# Patient Record
Sex: MALE | Race: WHITE | Employment: OTHER | URBAN - METROPOLITAN AREA
[De-identification: names, ages, dates, MRNs, and addresses within clinical notes are randomized per-mention and may not be internally consistent; named-entity substitution may affect disease eponyms.]

---

## 2019-08-16 ENCOUNTER — APPOINTMENT (OUTPATIENT)
Dept: GENERAL RADIOLOGY | Age: 84
End: 2019-08-16
Payer: MEDICARE

## 2019-08-16 ENCOUNTER — APPOINTMENT (OUTPATIENT)
Dept: CT IMAGING | Age: 84
End: 2019-08-16
Payer: MEDICARE

## 2019-08-16 ENCOUNTER — HOSPITAL ENCOUNTER (EMERGENCY)
Age: 84
Discharge: HOME | End: 2019-08-16
Attending: EMERGENCY MEDICINE
Payer: MEDICARE

## 2019-08-16 VITALS
HEIGHT: 69 IN | WEIGHT: 172 LBS | TEMPERATURE: 98 F | BODY MASS INDEX: 25.48 KG/M2 | HEART RATE: 56 BPM | OXYGEN SATURATION: 97 % | RESPIRATION RATE: 16 BRPM | DIASTOLIC BLOOD PRESSURE: 92 MMHG | SYSTOLIC BLOOD PRESSURE: 165 MMHG

## 2019-08-16 DIAGNOSIS — R42 DIZZINESS: Primary | ICD-10-CM

## 2019-08-16 DIAGNOSIS — I95.1 ORTHOSTATIC HYPOTENSION: ICD-10-CM

## 2019-08-16 LAB
ALBUMIN SERPL-MCNC: 4.3 G/DL (ref 3.5–5.2)
ALP BLD-CCNC: 77 U/L (ref 40–129)
ALT SERPL-CCNC: 9 U/L (ref 0–40)
ANION GAP SERPL CALCULATED.3IONS-SCNC: 7 MMOL/L (ref 7–16)
APTT: 29.6 SEC (ref 24.5–35.1)
AST SERPL-CCNC: 16 U/L (ref 0–39)
BILIRUB SERPL-MCNC: 0.8 MG/DL (ref 0–1.2)
BILIRUBIN URINE: NEGATIVE
BLOOD, URINE: NEGATIVE
BUN BLDV-MCNC: 21 MG/DL (ref 8–23)
CALCIUM SERPL-MCNC: 8.9 MG/DL (ref 8.6–10.2)
CHLORIDE BLD-SCNC: 101 MMOL/L (ref 98–107)
CLARITY: CLEAR
CO2: 33 MMOL/L (ref 22–29)
COLOR: YELLOW
CREAT SERPL-MCNC: 1.2 MG/DL (ref 0.7–1.2)
EKG ATRIAL RATE: 54 BPM
EKG P AXIS: 82 DEGREES
EKG P-R INTERVAL: 244 MS
EKG Q-T INTERVAL: 428 MS
EKG QRS DURATION: 86 MS
EKG QTC CALCULATION (BAZETT): 405 MS
EKG R AXIS: 12 DEGREES
EKG T AXIS: 61 DEGREES
EKG VENTRICULAR RATE: 54 BPM
GFR AFRICAN AMERICAN: >60
GFR NON-AFRICAN AMERICAN: 58 ML/MIN/1.73
GLUCOSE BLD-MCNC: 108 MG/DL (ref 74–99)
GLUCOSE URINE: NEGATIVE MG/DL
HCT VFR BLD CALC: 43.5 % (ref 37–54)
HEMOGLOBIN: 13.7 G/DL (ref 12.5–16.5)
INR BLD: 1
KETONES, URINE: NEGATIVE MG/DL
LEUKOCYTE ESTERASE, URINE: NEGATIVE
MCH RBC QN AUTO: 27.5 PG (ref 26–35)
MCHC RBC AUTO-ENTMCNC: 31.5 % (ref 32–34.5)
MCV RBC AUTO: 87.2 FL (ref 80–99.9)
NITRITE, URINE: NEGATIVE
PDW BLD-RTO: 15.5 FL (ref 11.5–15)
PH UA: 5.5 (ref 5–9)
PLATELET # BLD: 206 E9/L (ref 130–450)
PMV BLD AUTO: 9.8 FL (ref 7–12)
POTASSIUM SERPL-SCNC: 4.7 MMOL/L (ref 3.5–5)
PRO-BNP: 360 PG/ML (ref 0–450)
PROTEIN UA: NEGATIVE MG/DL
PROTHROMBIN TIME: 11.6 SEC (ref 9.3–12.4)
RBC # BLD: 4.99 E12/L (ref 3.8–5.8)
SODIUM BLD-SCNC: 141 MMOL/L (ref 132–146)
SPECIFIC GRAVITY UA: 1.01 (ref 1–1.03)
TOTAL PROTEIN: 7.3 G/DL (ref 6.4–8.3)
TROPONIN: <0.01 NG/ML (ref 0–0.03)
UROBILINOGEN, URINE: 1 E.U./DL
WBC # BLD: 7.1 E9/L (ref 4.5–11.5)

## 2019-08-16 PROCEDURE — 85027 COMPLETE CBC AUTOMATED: CPT

## 2019-08-16 PROCEDURE — 99284 EMERGENCY DEPT VISIT MOD MDM: CPT

## 2019-08-16 PROCEDURE — 93005 ELECTROCARDIOGRAM TRACING: CPT | Performed by: NURSE PRACTITIONER

## 2019-08-16 PROCEDURE — 2580000003 HC RX 258: Performed by: PREVENTIVE MEDICINE

## 2019-08-16 PROCEDURE — 84484 ASSAY OF TROPONIN QUANT: CPT

## 2019-08-16 PROCEDURE — 85610 PROTHROMBIN TIME: CPT

## 2019-08-16 PROCEDURE — 81003 URINALYSIS AUTO W/O SCOPE: CPT

## 2019-08-16 PROCEDURE — 93010 ELECTROCARDIOGRAM REPORT: CPT | Performed by: INTERNAL MEDICINE

## 2019-08-16 PROCEDURE — 36415 COLL VENOUS BLD VENIPUNCTURE: CPT

## 2019-08-16 PROCEDURE — 80053 COMPREHEN METABOLIC PANEL: CPT

## 2019-08-16 PROCEDURE — 83880 ASSAY OF NATRIURETIC PEPTIDE: CPT

## 2019-08-16 PROCEDURE — 85730 THROMBOPLASTIN TIME PARTIAL: CPT

## 2019-08-16 PROCEDURE — 71046 X-RAY EXAM CHEST 2 VIEWS: CPT

## 2019-08-16 PROCEDURE — 70450 CT HEAD/BRAIN W/O DYE: CPT

## 2019-08-16 RX ORDER — 0.9 % SODIUM CHLORIDE 0.9 %
1000 INTRAVENOUS SOLUTION INTRAVENOUS ONCE
Status: COMPLETED | OUTPATIENT
Start: 2019-08-16 | End: 2019-08-16

## 2019-08-16 RX ORDER — 0.9 % SODIUM CHLORIDE 0.9 %
2000 INTRAVENOUS SOLUTION INTRAVENOUS ONCE
Status: DISCONTINUED | OUTPATIENT
Start: 2019-08-16 | End: 2019-08-16 | Stop reason: HOSPADM

## 2019-08-16 RX ADMIN — SODIUM CHLORIDE 1000 ML: 9 INJECTION, SOLUTION INTRAVENOUS at 14:00

## 2019-08-16 ASSESSMENT — ENCOUNTER SYMPTOMS
CHEST TIGHTNESS: 0
SHORTNESS OF BREATH: 0
ABDOMINAL PAIN: 0
CONSTIPATION: 0
NAUSEA: 0
VOMITING: 0
COUGH: 0
DIARRHEA: 0
ALLERGIC/IMMUNOLOGIC NEGATIVE: 1

## 2019-08-27 ENCOUNTER — HOSPITAL ENCOUNTER (OUTPATIENT)
Age: 84
Setting detail: OBSERVATION
Discharge: HOME HEALTH | End: 2019-08-29
Attending: EMERGENCY MEDICINE | Admitting: INTERNAL MEDICINE
Payer: MEDICARE

## 2019-08-27 ENCOUNTER — APPOINTMENT (OUTPATIENT)
Dept: ULTRASOUND IMAGING | Age: 84
End: 2019-08-27
Payer: MEDICARE

## 2019-08-27 ENCOUNTER — APPOINTMENT (OUTPATIENT)
Dept: GENERAL RADIOLOGY | Age: 84
End: 2019-08-27
Payer: MEDICARE

## 2019-08-27 DIAGNOSIS — R42 DIZZINESS: ICD-10-CM

## 2019-08-27 DIAGNOSIS — R55 NEAR SYNCOPE: Primary | ICD-10-CM

## 2019-08-27 LAB
ALBUMIN SERPL-MCNC: 4.3 G/DL (ref 3.5–5.2)
ALP BLD-CCNC: 82 U/L (ref 40–129)
ALT SERPL-CCNC: 9 U/L (ref 0–40)
ANION GAP SERPL CALCULATED.3IONS-SCNC: 7 MMOL/L (ref 7–16)
AST SERPL-CCNC: 15 U/L (ref 0–39)
BASOPHILS ABSOLUTE: 0.05 E9/L (ref 0–0.2)
BASOPHILS RELATIVE PERCENT: 0.5 % (ref 0–2)
BILIRUB SERPL-MCNC: 0.7 MG/DL (ref 0–1.2)
BILIRUBIN URINE: NEGATIVE
BLOOD, URINE: NEGATIVE
BUN BLDV-MCNC: 23 MG/DL (ref 8–23)
CALCIUM SERPL-MCNC: 9.4 MG/DL (ref 8.6–10.2)
CHLORIDE BLD-SCNC: 101 MMOL/L (ref 98–107)
CLARITY: CLEAR
CO2: 32 MMOL/L (ref 22–29)
COLOR: YELLOW
CREAT SERPL-MCNC: 1.2 MG/DL (ref 0.7–1.2)
EOSINOPHILS ABSOLUTE: 0.11 E9/L (ref 0.05–0.5)
EOSINOPHILS RELATIVE PERCENT: 1.1 % (ref 0–6)
GFR AFRICAN AMERICAN: >60
GFR NON-AFRICAN AMERICAN: 58 ML/MIN/1.73
GLUCOSE BLD-MCNC: 97 MG/DL (ref 74–99)
GLUCOSE URINE: NEGATIVE MG/DL
HCT VFR BLD CALC: 45.1 % (ref 37–54)
HEMOGLOBIN: 14.5 G/DL (ref 12.5–16.5)
IMMATURE GRANULOCYTES #: 0.04 E9/L
IMMATURE GRANULOCYTES %: 0.4 % (ref 0–5)
KETONES, URINE: NEGATIVE MG/DL
LEUKOCYTE ESTERASE, URINE: NEGATIVE
LYMPHOCYTES ABSOLUTE: 1.57 E9/L (ref 1.5–4)
LYMPHOCYTES RELATIVE PERCENT: 15.2 % (ref 20–42)
MAGNESIUM: 2.2 MG/DL (ref 1.6–2.6)
MCH RBC QN AUTO: 28.2 PG (ref 26–35)
MCHC RBC AUTO-ENTMCNC: 32.2 % (ref 32–34.5)
MCV RBC AUTO: 87.7 FL (ref 80–99.9)
MONOCYTES ABSOLUTE: 0.53 E9/L (ref 0.1–0.95)
MONOCYTES RELATIVE PERCENT: 5.1 % (ref 2–12)
NEUTROPHILS ABSOLUTE: 8.04 E9/L (ref 1.8–7.3)
NEUTROPHILS RELATIVE PERCENT: 77.7 % (ref 43–80)
NITRITE, URINE: NEGATIVE
PDW BLD-RTO: 15.3 FL (ref 11.5–15)
PH UA: 6.5 (ref 5–9)
PLATELET # BLD: 239 E9/L (ref 130–450)
PMV BLD AUTO: 10 FL (ref 7–12)
POTASSIUM SERPL-SCNC: 4.9 MMOL/L (ref 3.5–5)
PROTEIN UA: NEGATIVE MG/DL
RBC # BLD: 5.14 E12/L (ref 3.8–5.8)
SODIUM BLD-SCNC: 140 MMOL/L (ref 132–146)
SPECIFIC GRAVITY UA: <=1.005 (ref 1–1.03)
TOTAL PROTEIN: 7.3 G/DL (ref 6.4–8.3)
TROPONIN: <0.01 NG/ML (ref 0–0.03)
UROBILINOGEN, URINE: 0.2 E.U./DL
WBC # BLD: 10.3 E9/L (ref 4.5–11.5)

## 2019-08-27 PROCEDURE — 2580000003 HC RX 258: Performed by: INTERNAL MEDICINE

## 2019-08-27 PROCEDURE — 99285 EMERGENCY DEPT VISIT HI MDM: CPT

## 2019-08-27 PROCEDURE — 81003 URINALYSIS AUTO W/O SCOPE: CPT

## 2019-08-27 PROCEDURE — 36415 COLL VENOUS BLD VENIPUNCTURE: CPT

## 2019-08-27 PROCEDURE — 85025 COMPLETE CBC W/AUTO DIFF WBC: CPT

## 2019-08-27 PROCEDURE — 83735 ASSAY OF MAGNESIUM: CPT

## 2019-08-27 PROCEDURE — 93005 ELECTROCARDIOGRAM TRACING: CPT | Performed by: NURSE PRACTITIONER

## 2019-08-27 PROCEDURE — 6370000000 HC RX 637 (ALT 250 FOR IP): Performed by: NURSE PRACTITIONER

## 2019-08-27 PROCEDURE — 84484 ASSAY OF TROPONIN QUANT: CPT

## 2019-08-27 PROCEDURE — G0378 HOSPITAL OBSERVATION PER HR: HCPCS

## 2019-08-27 PROCEDURE — 71046 X-RAY EXAM CHEST 2 VIEWS: CPT

## 2019-08-27 PROCEDURE — 93880 EXTRACRANIAL BILAT STUDY: CPT

## 2019-08-27 PROCEDURE — 80053 COMPREHEN METABOLIC PANEL: CPT

## 2019-08-27 RX ORDER — SODIUM CHLORIDE 0.9 % (FLUSH) 0.9 %
10 SYRINGE (ML) INJECTION PRN
Status: DISCONTINUED | OUTPATIENT
Start: 2019-08-27 | End: 2019-08-29 | Stop reason: HOSPADM

## 2019-08-27 RX ORDER — SODIUM CHLORIDE 0.9 % (FLUSH) 0.9 %
10 SYRINGE (ML) INJECTION EVERY 12 HOURS SCHEDULED
Status: DISCONTINUED | OUTPATIENT
Start: 2019-08-27 | End: 2019-08-29 | Stop reason: HOSPADM

## 2019-08-27 RX ORDER — ACETAMINOPHEN 325 MG/1
650 TABLET ORAL EVERY 4 HOURS PRN
Status: DISCONTINUED | OUTPATIENT
Start: 2019-08-27 | End: 2019-08-29 | Stop reason: HOSPADM

## 2019-08-27 RX ORDER — LEVOTHYROXINE SODIUM 0.07 MG/1
75 TABLET ORAL DAILY
COMMUNITY
Start: 2019-08-19

## 2019-08-27 RX ORDER — SODIUM CHLORIDE 9 MG/ML
INJECTION, SOLUTION INTRAVENOUS CONTINUOUS
Status: DISCONTINUED | OUTPATIENT
Start: 2019-08-27 | End: 2019-08-28

## 2019-08-27 RX ORDER — ACETAMINOPHEN 325 MG/1
650 TABLET ORAL EVERY 4 HOURS PRN
Status: DISCONTINUED | OUTPATIENT
Start: 2019-08-27 | End: 2019-08-27

## 2019-08-27 RX ORDER — CHOLECALCIFEROL (VITAMIN D3) 1250 MCG
50000 CAPSULE ORAL
COMMUNITY

## 2019-08-27 RX ORDER — SODIUM CHLORIDE 0.9 % (FLUSH) 0.9 %
10 SYRINGE (ML) INJECTION PRN
Status: DISCONTINUED | OUTPATIENT
Start: 2019-08-27 | End: 2019-08-27

## 2019-08-27 RX ORDER — ONDANSETRON 2 MG/ML
4 INJECTION INTRAMUSCULAR; INTRAVENOUS EVERY 6 HOURS PRN
Status: DISCONTINUED | OUTPATIENT
Start: 2019-08-27 | End: 2019-08-29 | Stop reason: HOSPADM

## 2019-08-27 RX ORDER — MECLIZINE HCL 12.5 MG/1
12.5 TABLET ORAL ONCE
Status: COMPLETED | OUTPATIENT
Start: 2019-08-27 | End: 2019-08-27

## 2019-08-27 RX ORDER — SODIUM CHLORIDE 0.9 % (FLUSH) 0.9 %
10 SYRINGE (ML) INJECTION EVERY 12 HOURS SCHEDULED
Status: DISCONTINUED | OUTPATIENT
Start: 2019-08-27 | End: 2019-08-27

## 2019-08-27 RX ADMIN — SODIUM CHLORIDE: 9 INJECTION, SOLUTION INTRAVENOUS at 22:39

## 2019-08-27 RX ADMIN — MECLIZINE 12.5 MG: 12.5 TABLET ORAL at 17:20

## 2019-08-27 RX ADMIN — Medication 10 ML: at 22:40

## 2019-08-27 ASSESSMENT — PAIN SCALES - GENERAL: PAINLEVEL_OUTOF10: 0

## 2019-08-27 NOTE — ED PROVIDER NOTES
ms    QTc Calculation (Bazett) 410 ms    P Axis 74 degrees    R Axis 27 degrees    T Axis 55 degrees     Imaging: All Radiology results interpreted by Radiologist unless otherwise noted. XR CHEST STANDARD (2 VW)   Final Result      Persistent opacities at left lung base are related to atelectasis or   persistent pneumonia. Continued follow-up may be helpful for further   evaluation. US CAROTID ARTERY BILATERAL    (Results Pending)     EKG #1:  Interpreted by emergency department physician unless otherwise noted. Time:  1223  Rate: 61  Rhythm: Sinus and 1st degree av block. Interpretation:normal sinus rhythm, 1st degree AV block. unchanged from previous ekg    ED Course / Medical Decision Making     Medications   meclizine (ANTIVERT) tablet 12.5 mg (12.5 mg Oral Given 8/27/19 1720)        Re-Evaluations:  8/27/19      Time: 1820  At this time states that he is feeling \"okay\". Patient agreeable to hospital admission. Son at the bedside. Consultations:             IP CONSULT TO HOSPITALIST  Spoke with Dr. Ana Chaudhary (Medicine). Discussed case. They will admit this patient. Procedures:   none    MDM: patient will be admitted to telemetry for near syncope. At this time the patient is nontoxic in appearance is in no distress. Patient's questions were answered. Son is at the bedside. Patient and his son are agreeable to hospital admission at this time. Patient was given Antivert fluids. Patient with positive orthostatic vital signs. Counseling:   I have spoken with the son and patient and discussed todays results, in addition to providing specific details for the plan of care and counseling regarding the diagnosis and prognosis and are agreeable with the plan. Assessment      1. Near syncope    2. Dizziness      This patient's ED course included: a personal history and physicial examination  This patient has remained hemodynamically stable during their ED course.    Plan   Admit to

## 2019-08-28 ENCOUNTER — APPOINTMENT (OUTPATIENT)
Dept: GENERAL RADIOLOGY | Age: 84
End: 2019-08-28
Payer: MEDICARE

## 2019-08-28 ENCOUNTER — APPOINTMENT (OUTPATIENT)
Dept: MRI IMAGING | Age: 84
End: 2019-08-28
Payer: MEDICARE

## 2019-08-28 PROBLEM — E03.9 ACQUIRED HYPOTHYROIDISM: Chronic | Status: ACTIVE | Noted: 2019-08-28

## 2019-08-28 PROBLEM — I10 ESSENTIAL HYPERTENSION: Chronic | Status: ACTIVE | Noted: 2019-08-28

## 2019-08-28 LAB
ANION GAP SERPL CALCULATED.3IONS-SCNC: 10 MMOL/L (ref 7–16)
BUN BLDV-MCNC: 18 MG/DL (ref 8–23)
CALCIUM SERPL-MCNC: 8.3 MG/DL (ref 8.6–10.2)
CHLORIDE BLD-SCNC: 106 MMOL/L (ref 98–107)
CO2: 27 MMOL/L (ref 22–29)
CREAT SERPL-MCNC: 1.1 MG/DL (ref 0.7–1.2)
EKG ATRIAL RATE: 61 BPM
EKG P AXIS: 74 DEGREES
EKG P-R INTERVAL: 232 MS
EKG Q-T INTERVAL: 408 MS
EKG QRS DURATION: 82 MS
EKG QTC CALCULATION (BAZETT): 410 MS
EKG R AXIS: 27 DEGREES
EKG T AXIS: 55 DEGREES
EKG VENTRICULAR RATE: 61 BPM
GFR AFRICAN AMERICAN: >60
GFR NON-AFRICAN AMERICAN: >60 ML/MIN/1.73
GLUCOSE BLD-MCNC: 88 MG/DL (ref 74–99)
HCT VFR BLD CALC: 37.3 % (ref 37–54)
HEMOGLOBIN: 12.1 G/DL (ref 12.5–16.5)
LV EF: 60 %
LVEF MODALITY: NORMAL
MCH RBC QN AUTO: 28.2 PG (ref 26–35)
MCHC RBC AUTO-ENTMCNC: 32.4 % (ref 32–34.5)
MCV RBC AUTO: 86.9 FL (ref 80–99.9)
PDW BLD-RTO: 15.4 FL (ref 11.5–15)
PLATELET # BLD: 167 E9/L (ref 130–450)
PMV BLD AUTO: 9.6 FL (ref 7–12)
POTASSIUM REFLEX MAGNESIUM: 4.8 MMOL/L (ref 3.5–5)
RBC # BLD: 4.29 E12/L (ref 3.8–5.8)
SODIUM BLD-SCNC: 143 MMOL/L (ref 132–146)
WBC # BLD: 5.9 E9/L (ref 4.5–11.5)

## 2019-08-28 PROCEDURE — 97530 THERAPEUTIC ACTIVITIES: CPT

## 2019-08-28 PROCEDURE — 80048 BASIC METABOLIC PNL TOTAL CA: CPT

## 2019-08-28 PROCEDURE — 2580000003 HC RX 258: Performed by: INTERNAL MEDICINE

## 2019-08-28 PROCEDURE — 97161 PT EVAL LOW COMPLEX 20 MIN: CPT

## 2019-08-28 PROCEDURE — 93306 TTE W/DOPPLER COMPLETE: CPT

## 2019-08-28 PROCEDURE — 96372 THER/PROPH/DIAG INJ SC/IM: CPT

## 2019-08-28 PROCEDURE — 85027 COMPLETE CBC AUTOMATED: CPT

## 2019-08-28 PROCEDURE — G0378 HOSPITAL OBSERVATION PER HR: HCPCS

## 2019-08-28 PROCEDURE — 6360000002 HC RX W HCPCS: Performed by: INTERNAL MEDICINE

## 2019-08-28 PROCEDURE — 36415 COLL VENOUS BLD VENIPUNCTURE: CPT

## 2019-08-28 PROCEDURE — 93010 ELECTROCARDIOGRAM REPORT: CPT | Performed by: INTERNAL MEDICINE

## 2019-08-28 PROCEDURE — 70030 X-RAY EYE FOR FOREIGN BODY: CPT

## 2019-08-28 RX ORDER — SODIUM CHLORIDE 9 MG/ML
INJECTION, SOLUTION INTRAVENOUS CONTINUOUS
Status: DISCONTINUED | OUTPATIENT
Start: 2019-08-28 | End: 2019-08-29 | Stop reason: HOSPADM

## 2019-08-28 RX ADMIN — SODIUM CHLORIDE: 9 INJECTION, SOLUTION INTRAVENOUS at 18:48

## 2019-08-28 RX ADMIN — ENOXAPARIN SODIUM 40 MG: 40 INJECTION SUBCUTANEOUS at 09:57

## 2019-08-28 RX ADMIN — Medication 10 ML: at 09:57

## 2019-08-28 ASSESSMENT — PAIN SCALES - GENERAL
PAINLEVEL_OUTOF10: 0

## 2019-08-28 NOTE — PLAN OF CARE
Problem: Falls - Risk of:  Goal: Will remain free from falls  Description  Will remain free from falls  Outcome: Met This Shift     Problem: Cardiac:  Goal: Ability to maintain an adequate cardiac output will improve  Description  Ability to maintain an adequate cardiac output will improve  Outcome: Met This Shift  Goal: Hemodynamic stability will improve  Description  Hemodynamic stability will improve  Outcome: Met This Shift

## 2019-08-28 NOTE — CARE COORDINATION
MURIEL Discharge planning:    SW met with patient. Patient lives alone in a 1 story home with 2 steps to enter. Patient owns a ww, cane and shower chair. Patient was independent PTA and is driving. Patient has no history of leyda or hhc. Plan is to return home with no anticipated discharge needs. Patients son to provide the transportation home. Pharmacy is [facility] in [place]. Will continue to follow.  [place], [place]

## 2019-08-28 NOTE — PROCEDURES
8/28/19 at 2:48 pm - PERCY Wills is aware that pt is claustrophobic. Pt refused MRI. [facility] if pt decides he wants to attempt the MRI again.

## 2019-08-29 VITALS
RESPIRATION RATE: 16 BRPM | TEMPERATURE: 97.6 F | SYSTOLIC BLOOD PRESSURE: 120 MMHG | OXYGEN SATURATION: 95 % | HEIGHT: 69 IN | HEART RATE: 72 BPM | BODY MASS INDEX: 26.36 KG/M2 | WEIGHT: 178 LBS | DIASTOLIC BLOOD PRESSURE: 69 MMHG

## 2019-08-29 PROBLEM — I95.1 ORTHOSTATIC HYPOTENSION: Status: ACTIVE | Noted: 2019-08-29

## 2019-08-29 PROBLEM — R42 DIZZINESS: Status: RESOLVED | Noted: 2019-08-27 | Resolved: 2019-08-29

## 2019-08-29 PROCEDURE — 2580000003 HC RX 258: Performed by: INTERNAL MEDICINE

## 2019-08-29 PROCEDURE — G0378 HOSPITAL OBSERVATION PER HR: HCPCS

## 2019-08-29 RX ADMIN — SODIUM CHLORIDE: 9 INJECTION, SOLUTION INTRAVENOUS at 05:03

## 2019-08-29 ASSESSMENT — PAIN SCALES - GENERAL: PAINLEVEL_OUTOF10: 0

## 2019-08-29 NOTE — PROGRESS NOTES
Occupational Therapy  OT consult received to eval/treat and chart review complete. Initial eval attempted, however, pt off unit. OT to re-attempt at a later time. Thank You.        [place], [place]  [id number]
PATIENT ORTHOSTATIC BP  BP:144/70 HR:65  BP:127/67 HR:77  BP:119/54 HR:71
08/27/19 178 lb (80.7 kg)   08/16/19 172 lb (78 kg)         Intake/Output:    Intake/Output Summary (Last 24 hours) at 8/29/2019 0953  Last data filed at 8/29/2019 0924  Gross per 24 hour   Intake 2813 ml   Output 1100 ml   Net 1713 ml     I/O this shift: In: 480 [P.O.:480]  Out: -       General: Awake, alert, oriented x3, no acute distress    Neck: No JVD or carotid bruits,     Cardiac: Regular rate and rhythm, normal S1 and  S2, no murmurs, no S4 or S3, no  rubs. Normal carotid upstroke and no bruits. Resp: Unlabored respirations at rest.  No wheezes, rales or rhonchi. Abdomen: soft, nontender, nondistended, BS+; no masses, bruits, or hepatomegaly    Extremities: no cyanosis, clubbing, or edema. Normal pulses in the upper/lower extremities bilaterally. Skin: Warm and dry, no bruises, petechiae or rashes. Neuro: moves all extremities appropriately to command, and normal sensation to light touch in the upper and lower extremities bilaterally. Laboratory Tests:  Lab Results   Component Value Date    CREATININE 1.1 08/28/2019    BUN 18 08/28/2019     08/28/2019    K 4.8 08/28/2019     08/28/2019    CO2 27 08/28/2019     Recent Labs     08/27/19  1258   TROPONINI <0.01     Lab Results   Component Value Date    PROBNP 360 08/16/2019     Lab Results   Component Value Date    WBC 5.9 08/28/2019    RBC 4.29 08/28/2019    HGB 12.1 08/28/2019    HCT 37.3 08/28/2019    MCV 86.9 08/28/2019    MCH 28.2 08/28/2019    MCHC 32.4 08/28/2019    RDW 15.4 08/28/2019     08/28/2019    MPV 9.6 08/28/2019     Recent Labs     08/27/19  1258   ALKPHOS 82   ALT 9   AST 15   PROT 7.3   BILITOT 0.7   LABALBU 4.3     Lab Results   Component Value Date    MG 2.2 08/27/2019     Lab Results   Component Value Date    PROTIME 11.6 08/16/2019    INR 1.0 08/16/2019     Radiology:  XR Eye Foreign Body   Final Result   No radiopaque foreign body is identified within the orbits.       US CAROTID ARTERY BILATERAL

## 2019-11-01 ENCOUNTER — HOSPITAL ENCOUNTER (OUTPATIENT)
Dept: CT IMAGING | Age: 84
Discharge: HOME | End: 2019-11-03
Payer: MEDICARE

## 2019-11-01 DIAGNOSIS — H90.3 SENSORY HEARING LOSS, BILATERAL: ICD-10-CM

## 2019-11-01 DIAGNOSIS — R42 DIZZINESS: ICD-10-CM

## 2019-11-01 PROCEDURE — 6360000004 HC RX CONTRAST MEDICATION: Performed by: RADIOLOGY

## 2019-11-01 PROCEDURE — 70496 CT ANGIOGRAPHY HEAD: CPT

## 2019-11-01 RX ADMIN — IOPAMIDOL 90 ML: 755 INJECTION, SOLUTION INTRAVENOUS at 19:06

## 2019-11-25 ENCOUNTER — INITIAL CONSULT (OUTPATIENT)
Dept: NEUROSURGERY | Age: 84
End: 2019-11-25
Payer: MEDICARE

## 2019-11-25 VITALS
BODY MASS INDEX: 27.4 KG/M2 | DIASTOLIC BLOOD PRESSURE: 82 MMHG | WEIGHT: 185 LBS | HEART RATE: 65 BPM | SYSTOLIC BLOOD PRESSURE: 144 MMHG | HEIGHT: 69 IN

## 2019-11-25 DIAGNOSIS — G96.08 SUBDURAL HYGROMA: Primary | ICD-10-CM

## 2019-11-25 PROCEDURE — G8427 DOCREV CUR MEDS BY ELIG CLIN: HCPCS | Performed by: PHYSICIAN ASSISTANT

## 2019-11-25 PROCEDURE — G8484 FLU IMMUNIZE NO ADMIN: HCPCS | Performed by: PHYSICIAN ASSISTANT

## 2019-11-25 PROCEDURE — 99203 OFFICE O/P NEW LOW 30 MIN: CPT | Performed by: PHYSICIAN ASSISTANT

## 2019-11-25 PROCEDURE — G8417 CALC BMI ABV UP PARAM F/U: HCPCS | Performed by: PHYSICIAN ASSISTANT

## 2019-11-25 PROCEDURE — 1123F ACP DISCUSS/DSCN MKR DOCD: CPT | Performed by: PHYSICIAN ASSISTANT

## 2019-11-25 PROCEDURE — 1036F TOBACCO NON-USER: CPT | Performed by: PHYSICIAN ASSISTANT

## 2019-11-25 PROCEDURE — 4040F PNEUMOC VAC/ADMIN/RCVD: CPT | Performed by: PHYSICIAN ASSISTANT

## 2019-11-25 RX ORDER — PRAVASTATIN SODIUM 40 MG
40 TABLET ORAL DAILY
COMMUNITY
End: 2022-01-01

## 2019-11-25 RX ORDER — SERTRALINE HYDROCHLORIDE 25 MG/1
25 TABLET, FILM COATED ORAL DAILY
COMMUNITY
End: 2022-01-01

## 2019-11-25 RX ORDER — MIDODRINE HYDROCHLORIDE 2.5 MG/1
2.5 TABLET ORAL 3 TIMES DAILY
COMMUNITY

## 2019-11-25 RX ORDER — M-VIT,TX,IRON,MINS/CALC/FOLIC 27MG-0.4MG
1 TABLET ORAL DAILY
COMMUNITY

## 2019-11-25 SDOH — HEALTH STABILITY: MENTAL HEALTH: HOW OFTEN DO YOU HAVE A DRINK CONTAINING ALCOHOL?: NEVER

## 2022-01-01 ENCOUNTER — HOSPITAL ENCOUNTER (OUTPATIENT)
Dept: INFUSION THERAPY | Age: 87
Discharge: HOME | DRG: 280 | End: 2022-08-22
Payer: MEDICARE

## 2022-01-01 ENCOUNTER — TELEPHONE (OUTPATIENT)
Dept: CASE MANAGEMENT | Age: 87
End: 2022-01-01

## 2022-01-01 ENCOUNTER — HOSPITAL ENCOUNTER (INPATIENT)
Age: 87
LOS: 2 days | DRG: 283 | End: 2022-09-14
Attending: EMERGENCY MEDICINE | Admitting: INTERNAL MEDICINE
Payer: MEDICARE

## 2022-01-01 ENCOUNTER — HOSPITAL ENCOUNTER (INPATIENT)
Age: 87
LOS: 6 days | Discharge: NURSING, REHAB OR LONG-TERM CARE | DRG: 280 | End: 2022-08-30
Attending: EMERGENCY MEDICINE
Payer: MEDICARE

## 2022-01-01 ENCOUNTER — HOSPITAL ENCOUNTER (OUTPATIENT)
Dept: INFUSION THERAPY | Age: 87
Discharge: HOME | DRG: 280 | End: 2022-08-23
Payer: MEDICARE

## 2022-01-01 ENCOUNTER — OFFICE VISIT (OUTPATIENT)
Dept: ONCOLOGY | Age: 87
End: 2022-01-01
Payer: MEDICARE

## 2022-01-01 ENCOUNTER — APPOINTMENT (OUTPATIENT)
Dept: CT IMAGING | Age: 87
DRG: 280 | End: 2022-01-01
Payer: MEDICARE

## 2022-01-01 ENCOUNTER — TELEPHONE (OUTPATIENT)
Dept: PALLATIVE CARE | Age: 87
End: 2022-01-01

## 2022-01-01 ENCOUNTER — HOSPITAL ENCOUNTER (OUTPATIENT)
Dept: INFUSION THERAPY | Age: 87
Discharge: HOME | End: 2022-08-16

## 2022-01-01 ENCOUNTER — APPOINTMENT (OUTPATIENT)
Dept: CT IMAGING | Age: 87
DRG: 283 | End: 2022-01-01
Payer: MEDICARE

## 2022-01-01 ENCOUNTER — HOSPITAL ENCOUNTER (OUTPATIENT)
Dept: INFUSION THERAPY | Age: 87
Discharge: HOME | End: 2022-08-02
Payer: MEDICARE

## 2022-01-01 ENCOUNTER — HOSPITAL ENCOUNTER (OUTPATIENT)
Dept: INFUSION THERAPY | Age: 87
Discharge: HOME | End: 2022-08-30

## 2022-01-01 ENCOUNTER — APPOINTMENT (OUTPATIENT)
Dept: GENERAL RADIOLOGY | Age: 87
DRG: 280 | End: 2022-01-01
Payer: MEDICARE

## 2022-01-01 ENCOUNTER — TELEPHONE (OUTPATIENT)
Dept: ONCOLOGY | Age: 87
End: 2022-01-01

## 2022-01-01 ENCOUNTER — APPOINTMENT (OUTPATIENT)
Dept: GENERAL RADIOLOGY | Age: 87
DRG: 283 | End: 2022-01-01
Payer: MEDICARE

## 2022-01-01 ENCOUNTER — OFFICE VISIT (OUTPATIENT)
Dept: PALLATIVE CARE | Age: 87
DRG: 280 | End: 2022-01-01
Payer: MEDICARE

## 2022-01-01 VITALS
HEART RATE: 74 BPM | SYSTOLIC BLOOD PRESSURE: 84 MMHG | HEIGHT: 69 IN | TEMPERATURE: 96.8 F | WEIGHT: 206 LBS | OXYGEN SATURATION: 100 % | BODY MASS INDEX: 30.51 KG/M2 | DIASTOLIC BLOOD PRESSURE: 50 MMHG | RESPIRATION RATE: 8 BRPM

## 2022-01-01 VITALS
HEIGHT: 69 IN | TEMPERATURE: 97.9 F | BODY MASS INDEX: 30.57 KG/M2 | HEART RATE: 61 BPM | WEIGHT: 206.4 LBS | RESPIRATION RATE: 19 BRPM | OXYGEN SATURATION: 95 % | SYSTOLIC BLOOD PRESSURE: 139 MMHG | DIASTOLIC BLOOD PRESSURE: 67 MMHG

## 2022-01-01 VITALS
OXYGEN SATURATION: 90 % | DIASTOLIC BLOOD PRESSURE: 74 MMHG | BODY MASS INDEX: 27.62 KG/M2 | HEART RATE: 75 BPM | SYSTOLIC BLOOD PRESSURE: 146 MMHG | TEMPERATURE: 99 F | WEIGHT: 186.5 LBS | HEIGHT: 69 IN

## 2022-01-01 VITALS
DIASTOLIC BLOOD PRESSURE: 83 MMHG | TEMPERATURE: 97 F | BODY MASS INDEX: 28.72 KG/M2 | OXYGEN SATURATION: 93 % | WEIGHT: 193.9 LBS | HEART RATE: 61 BPM | SYSTOLIC BLOOD PRESSURE: 177 MMHG | HEIGHT: 69 IN

## 2022-01-01 VITALS
OXYGEN SATURATION: 94 % | RESPIRATION RATE: 16 BRPM | SYSTOLIC BLOOD PRESSURE: 173 MMHG | BODY MASS INDEX: 28.14 KG/M2 | HEIGHT: 69 IN | WEIGHT: 190 LBS | HEART RATE: 65 BPM | TEMPERATURE: 97.1 F | DIASTOLIC BLOOD PRESSURE: 76 MMHG

## 2022-01-01 VITALS
DIASTOLIC BLOOD PRESSURE: 57 MMHG | HEART RATE: 96 BPM | SYSTOLIC BLOOD PRESSURE: 108 MMHG | RESPIRATION RATE: 16 BRPM | TEMPERATURE: 98 F

## 2022-01-01 DIAGNOSIS — R53.0 NEOPLASTIC MALIGNANT RELATED FATIGUE: ICD-10-CM

## 2022-01-01 DIAGNOSIS — C34.91 PRIMARY ADENOCARCINOMA OF RIGHT LUNG (HCC): Primary | ICD-10-CM

## 2022-01-01 DIAGNOSIS — R79.89 ELEVATED LFTS: ICD-10-CM

## 2022-01-01 DIAGNOSIS — R91.8 PULMONARY MASS: ICD-10-CM

## 2022-01-01 DIAGNOSIS — C34.91 PRIMARY ADENOCARCINOMA OF RIGHT LUNG (HCC): ICD-10-CM

## 2022-01-01 DIAGNOSIS — R09.02 HYPOXIA: ICD-10-CM

## 2022-01-01 DIAGNOSIS — F41.9 ANXIETY: ICD-10-CM

## 2022-01-01 DIAGNOSIS — Z51.5 PALLIATIVE CARE BY SPECIALIST: ICD-10-CM

## 2022-01-01 DIAGNOSIS — R41.82 ALTERED MENTAL STATUS, UNSPECIFIED ALTERED MENTAL STATUS TYPE: Primary | ICD-10-CM

## 2022-01-01 DIAGNOSIS — C34.81 MALIGNANT NEOPLASM OF OVERLAPPING SITES OF RIGHT BRONCHUS AND LUNG (HCC): ICD-10-CM

## 2022-01-01 DIAGNOSIS — I21.4 NSTEMI (NON-ST ELEVATED MYOCARDIAL INFARCTION) (HCC): Primary | ICD-10-CM

## 2022-01-01 DIAGNOSIS — C91.10 CLL (CHRONIC LYMPHOCYTIC LEUKEMIA) (HCC): ICD-10-CM

## 2022-01-01 DIAGNOSIS — R94.8 ABNORMAL POSITRON EMISSION TOMOGRAPHY (PET) SCAN: ICD-10-CM

## 2022-01-01 DIAGNOSIS — J96.01 ACUTE RESPIRATORY FAILURE WITH HYPOXIA (HCC): ICD-10-CM

## 2022-01-01 DIAGNOSIS — G89.3 PAIN DUE TO NEOPLASM: ICD-10-CM

## 2022-01-01 DIAGNOSIS — I50.9 ACUTE CONGESTIVE HEART FAILURE, UNSPECIFIED HEART FAILURE TYPE (HCC): ICD-10-CM

## 2022-01-01 DIAGNOSIS — C34.31 MALIGNANT NEOPLASM OF LOWER LOBE OF RIGHT LUNG (HCC): ICD-10-CM

## 2022-01-01 LAB
ALBUMIN SERPL-MCNC: 2.8 G/DL (ref 3.5–5.2)
ALBUMIN SERPL-MCNC: 2.9 G/DL (ref 3.5–5.2)
ALBUMIN SERPL-MCNC: 3 G/DL (ref 3.5–5.2)
ALBUMIN SERPL-MCNC: 3.2 G/DL (ref 3.5–5.2)
ALBUMIN SERPL-MCNC: 3.8 G/DL (ref 3.5–5.2)
ALBUMIN SERPL-MCNC: 4.1 G/DL (ref 3.5–5.2)
ALBUMIN SERPL-MCNC: 4.2 G/DL (ref 3.5–5.2)
ALP BLD-CCNC: 105 U/L (ref 40–129)
ALP BLD-CCNC: 114 U/L (ref 40–129)
ALP BLD-CCNC: 117 U/L (ref 40–129)
ALP BLD-CCNC: 117 U/L (ref 40–129)
ALP BLD-CCNC: 125 U/L (ref 40–129)
ALP BLD-CCNC: 126 U/L (ref 40–129)
ALP BLD-CCNC: 133 U/L (ref 40–129)
ALP BLD-CCNC: 187 U/L (ref 40–129)
ALP BLD-CCNC: 229 U/L (ref 40–129)
ALP BLD-CCNC: 96 U/L (ref 40–129)
ALT SERPL-CCNC: 145 U/L (ref 0–40)
ALT SERPL-CCNC: 15 U/L (ref 0–40)
ALT SERPL-CCNC: 208 U/L (ref 0–40)
ALT SERPL-CCNC: 34 U/L (ref 0–40)
ALT SERPL-CCNC: 356 U/L (ref 0–40)
ALT SERPL-CCNC: 384 U/L (ref 0–40)
ALT SERPL-CCNC: 40 U/L (ref 0–40)
ALT SERPL-CCNC: 51 U/L (ref 0–40)
ALT SERPL-CCNC: 62 U/L (ref 0–40)
ALT SERPL-CCNC: 8 U/L (ref 0–40)
AMORPHOUS: ABNORMAL
ANION GAP SERPL CALCULATED.3IONS-SCNC: 10 MMOL/L (ref 7–16)
ANION GAP SERPL CALCULATED.3IONS-SCNC: 12 MMOL/L (ref 7–16)
ANION GAP SERPL CALCULATED.3IONS-SCNC: 12 MMOL/L (ref 7–16)
ANION GAP SERPL CALCULATED.3IONS-SCNC: 14 MMOL/L (ref 7–16)
ANION GAP SERPL CALCULATED.3IONS-SCNC: 16 MMOL/L (ref 7–16)
ANION GAP SERPL CALCULATED.3IONS-SCNC: 7 MMOL/L (ref 7–16)
ANION GAP SERPL CALCULATED.3IONS-SCNC: 7 MMOL/L (ref 7–16)
ANION GAP SERPL CALCULATED.3IONS-SCNC: 8 MMOL/L (ref 7–16)
ANION GAP SERPL CALCULATED.3IONS-SCNC: 9 MMOL/L (ref 7–16)
ANISOCYTOSIS: ABNORMAL
ANISOCYTOSIS: ABNORMAL
APTT: 24.5 SEC (ref 24.5–35.1)
APTT: 26.1 SEC (ref 24.5–35.1)
AST SERPL-CCNC: 16 U/L (ref 0–39)
AST SERPL-CCNC: 23 U/L (ref 0–39)
AST SERPL-CCNC: 30 U/L (ref 0–39)
AST SERPL-CCNC: 348 U/L (ref 0–39)
AST SERPL-CCNC: 395 U/L (ref 0–39)
AST SERPL-CCNC: 48 U/L (ref 0–39)
AST SERPL-CCNC: 51 U/L (ref 0–39)
AST SERPL-CCNC: 52 U/L (ref 0–39)
AST SERPL-CCNC: 53 U/L (ref 0–39)
AST SERPL-CCNC: 92 U/L (ref 0–39)
BACTERIA: ABNORMAL /HPF
BACTERIA: ABNORMAL /HPF
BASOPHILIC STIPPLING: ABNORMAL
BASOPHILS ABSOLUTE: 0 E9/L (ref 0–0.2)
BASOPHILS ABSOLUTE: 0.04 E9/L (ref 0–0.2)
BASOPHILS ABSOLUTE: 0.05 E9/L (ref 0–0.2)
BASOPHILS ABSOLUTE: 0.06 E9/L (ref 0–0.2)
BASOPHILS ABSOLUTE: 0.07 E9/L (ref 0–0.2)
BASOPHILS ABSOLUTE: 0.08 E9/L (ref 0–0.2)
BASOPHILS ABSOLUTE: 0.1 E9/L (ref 0–0.2)
BASOPHILS RELATIVE PERCENT: 0.4 % (ref 0–2)
BASOPHILS RELATIVE PERCENT: 0.5 % (ref 0–2)
BASOPHILS RELATIVE PERCENT: 0.5 % (ref 0–2)
BASOPHILS RELATIVE PERCENT: 0.6 % (ref 0–2)
BASOPHILS RELATIVE PERCENT: 0.7 % (ref 0–2)
BASOPHILS RELATIVE PERCENT: 0.7 % (ref 0–2)
BASOPHILS RELATIVE PERCENT: 0.9 % (ref 0–2)
BILIRUB SERPL-MCNC: 0.7 MG/DL (ref 0–1.2)
BILIRUB SERPL-MCNC: 0.8 MG/DL (ref 0–1.2)
BILIRUB SERPL-MCNC: 0.8 MG/DL (ref 0–1.2)
BILIRUB SERPL-MCNC: 0.9 MG/DL (ref 0–1.2)
BILIRUB SERPL-MCNC: 1 MG/DL (ref 0–1.2)
BILIRUB SERPL-MCNC: 1.2 MG/DL (ref 0–1.2)
BILIRUB SERPL-MCNC: 1.3 MG/DL (ref 0–1.2)
BILIRUB SERPL-MCNC: 1.9 MG/DL (ref 0–1.2)
BILIRUBIN DIRECT: 0.5 MG/DL (ref 0–0.3)
BILIRUBIN DIRECT: 0.9 MG/DL (ref 0–0.3)
BILIRUBIN URINE: ABNORMAL
BILIRUBIN URINE: NEGATIVE
BILIRUBIN, INDIRECT: 0.4 MG/DL (ref 0–1)
BILIRUBIN, INDIRECT: 0.4 MG/DL (ref 0–1)
BLOOD CULTURE, ROUTINE: NORMAL
BLOOD, URINE: ABNORMAL
BLOOD, URINE: NEGATIVE
BUN BLDV-MCNC: 20 MG/DL (ref 6–23)
BUN BLDV-MCNC: 21 MG/DL (ref 6–23)
BUN BLDV-MCNC: 22 MG/DL (ref 6–23)
BUN BLDV-MCNC: 26 MG/DL (ref 6–23)
BUN BLDV-MCNC: 31 MG/DL (ref 6–23)
BUN BLDV-MCNC: 40 MG/DL (ref 6–23)
BUN BLDV-MCNC: 40 MG/DL (ref 6–23)
BUN BLDV-MCNC: 41 MG/DL (ref 6–23)
BUN BLDV-MCNC: 48 MG/DL (ref 6–23)
BUN BLDV-MCNC: 51 MG/DL (ref 6–23)
BUN BLDV-MCNC: 57 MG/DL (ref 6–23)
BURR CELLS: ABNORMAL
CALCIUM SERPL-MCNC: 7.7 MG/DL (ref 8.6–10.2)
CALCIUM SERPL-MCNC: 7.8 MG/DL (ref 8.6–10.2)
CALCIUM SERPL-MCNC: 8 MG/DL (ref 8.6–10.2)
CALCIUM SERPL-MCNC: 8.2 MG/DL (ref 8.6–10.2)
CALCIUM SERPL-MCNC: 8.3 MG/DL (ref 8.6–10.2)
CALCIUM SERPL-MCNC: 8.4 MG/DL (ref 8.6–10.2)
CALCIUM SERPL-MCNC: 8.5 MG/DL (ref 8.6–10.2)
CALCIUM SERPL-MCNC: 8.7 MG/DL (ref 8.6–10.2)
CALCIUM SERPL-MCNC: 8.8 MG/DL (ref 8.6–10.2)
CALCIUM SERPL-MCNC: 8.8 MG/DL (ref 8.6–10.2)
CALCIUM SERPL-MCNC: 9 MG/DL (ref 8.6–10.2)
CHLORIDE BLD-SCNC: 100 MMOL/L (ref 98–107)
CHLORIDE BLD-SCNC: 101 MMOL/L (ref 98–107)
CHLORIDE BLD-SCNC: 104 MMOL/L (ref 98–107)
CHLORIDE BLD-SCNC: 95 MMOL/L (ref 98–107)
CHLORIDE BLD-SCNC: 98 MMOL/L (ref 98–107)
CHLORIDE BLD-SCNC: 99 MMOL/L (ref 98–107)
CHOLESTEROL, TOTAL: 106 MG/DL (ref 0–199)
CHOLESTEROL, TOTAL: 118 MG/DL (ref 0–199)
CLARITY: CLEAR
CLARITY: CLEAR
CO2: 26 MMOL/L (ref 22–29)
CO2: 27 MMOL/L (ref 22–29)
CO2: 27 MMOL/L (ref 22–29)
CO2: 28 MMOL/L (ref 22–29)
CO2: 30 MMOL/L (ref 22–29)
CO2: 30 MMOL/L (ref 22–29)
CO2: 35 MMOL/L (ref 22–29)
CO2: 38 MMOL/L (ref 22–29)
CO2: 38 MMOL/L (ref 22–29)
COLOR: YELLOW
COLOR: YELLOW
CREAT SERPL-MCNC: 0.9 MG/DL (ref 0.7–1.2)
CREAT SERPL-MCNC: 0.9 MG/DL (ref 0.7–1.2)
CREAT SERPL-MCNC: 1.2 MG/DL (ref 0.7–1.2)
CREAT SERPL-MCNC: 1.2 MG/DL (ref 0.7–1.2)
CREAT SERPL-MCNC: 1.3 MG/DL (ref 0.7–1.2)
CREAT SERPL-MCNC: 1.3 MG/DL (ref 0.7–1.2)
CREAT SERPL-MCNC: 1.6 MG/DL (ref 0.7–1.2)
CREAT SERPL-MCNC: 1.6 MG/DL (ref 0.7–1.2)
CREAT SERPL-MCNC: 2.1 MG/DL (ref 0.7–1.2)
CREAT SERPL-MCNC: 2.1 MG/DL (ref 0.7–1.2)
CREAT SERPL-MCNC: 2.2 MG/DL (ref 0.7–1.2)
CULTURE, BLOOD 2: NORMAL
EKG ATRIAL RATE: 100 BPM
EKG ATRIAL RATE: 250 BPM
EKG ATRIAL RATE: 79 BPM
EKG ATRIAL RATE: 89 BPM
EKG P AXIS: -138 DEGREES
EKG P AXIS: 21 DEGREES
EKG P AXIS: 78 DEGREES
EKG P-R INTERVAL: 186 MS
EKG P-R INTERVAL: 198 MS
EKG P-R INTERVAL: 208 MS
EKG Q-T INTERVAL: 336 MS
EKG Q-T INTERVAL: 368 MS
EKG Q-T INTERVAL: 370 MS
EKG Q-T INTERVAL: 372 MS
EKG QRS DURATION: 100 MS
EKG QRS DURATION: 82 MS
EKG QRS DURATION: 84 MS
EKG QRS DURATION: 98 MS
EKG QTC CALCULATION (BAZETT): 421 MS
EKG QTC CALCULATION (BAZETT): 452 MS
EKG QTC CALCULATION (BAZETT): 477 MS
EKG QTC CALCULATION (BAZETT): 482 MS
EKG R AXIS: -26 DEGREES
EKG R AXIS: -27 DEGREES
EKG R AXIS: -7 DEGREES
EKG R AXIS: 63 DEGREES
EKG T AXIS: -15 DEGREES
EKG T AXIS: -17 DEGREES
EKG T AXIS: 32 DEGREES
EKG T AXIS: 59 DEGREES
EKG VENTRICULAR RATE: 100 BPM
EKG VENTRICULAR RATE: 124 BPM
EKG VENTRICULAR RATE: 79 BPM
EKG VENTRICULAR RATE: 89 BPM
EOSINOPHILS ABSOLUTE: 0 E9/L (ref 0.05–0.5)
EOSINOPHILS ABSOLUTE: 0.02 E9/L (ref 0.05–0.5)
EOSINOPHILS ABSOLUTE: 0.06 E9/L (ref 0.05–0.5)
EOSINOPHILS ABSOLUTE: 0.16 E9/L (ref 0.05–0.5)
EOSINOPHILS ABSOLUTE: 0.17 E9/L (ref 0.05–0.5)
EOSINOPHILS ABSOLUTE: 0.21 E9/L (ref 0.05–0.5)
EOSINOPHILS ABSOLUTE: 0.27 E9/L (ref 0.05–0.5)
EOSINOPHILS ABSOLUTE: 0.32 E9/L (ref 0.05–0.5)
EOSINOPHILS ABSOLUTE: 0.44 E9/L (ref 0.05–0.5)
EOSINOPHILS RELATIVE PERCENT: 0.1 % (ref 0–6)
EOSINOPHILS RELATIVE PERCENT: 0.4 % (ref 0–6)
EOSINOPHILS RELATIVE PERCENT: 0.5 % (ref 0–6)
EOSINOPHILS RELATIVE PERCENT: 1.4 % (ref 0–6)
EOSINOPHILS RELATIVE PERCENT: 1.7 % (ref 0–6)
EOSINOPHILS RELATIVE PERCENT: 2.1 % (ref 0–6)
EOSINOPHILS RELATIVE PERCENT: 2.2 % (ref 0–6)
EOSINOPHILS RELATIVE PERCENT: 2.9 % (ref 0–6)
EOSINOPHILS RELATIVE PERCENT: 4.2 % (ref 0–6)
FINE CASTS, UA: ABNORMAL /LPF (ref 0–2)
GFR AFRICAN AMERICAN: 35
GFR AFRICAN AMERICAN: 36
GFR AFRICAN AMERICAN: 36
GFR AFRICAN AMERICAN: 50
GFR AFRICAN AMERICAN: 50
GFR AFRICAN AMERICAN: >60
GFR NON-AFRICAN AMERICAN: 29 ML/MIN/1.73
GFR NON-AFRICAN AMERICAN: 30 ML/MIN/1.73
GFR NON-AFRICAN AMERICAN: 30 ML/MIN/1.73
GFR NON-AFRICAN AMERICAN: 41 ML/MIN/1.73
GFR NON-AFRICAN AMERICAN: 41 ML/MIN/1.73
GFR NON-AFRICAN AMERICAN: 52 ML/MIN/1.73
GFR NON-AFRICAN AMERICAN: 52 ML/MIN/1.73
GFR NON-AFRICAN AMERICAN: 57 ML/MIN/1.73
GFR NON-AFRICAN AMERICAN: 58 ML/MIN/1.73
GFR NON-AFRICAN AMERICAN: >60 ML/MIN/1.73
GFR NON-AFRICAN AMERICAN: >60 ML/MIN/1.73
GLUCOSE BLD-MCNC: 101 MG/DL (ref 74–99)
GLUCOSE BLD-MCNC: 103 MG/DL (ref 74–99)
GLUCOSE BLD-MCNC: 104 MG/DL (ref 74–99)
GLUCOSE BLD-MCNC: 108 MG/DL (ref 74–99)
GLUCOSE BLD-MCNC: 113 MG/DL (ref 74–99)
GLUCOSE BLD-MCNC: 118 MG/DL (ref 74–99)
GLUCOSE BLD-MCNC: 143 MG/DL (ref 74–99)
GLUCOSE BLD-MCNC: 147 MG/DL (ref 74–99)
GLUCOSE BLD-MCNC: 88 MG/DL (ref 74–99)
GLUCOSE BLD-MCNC: 92 MG/DL (ref 74–99)
GLUCOSE BLD-MCNC: 93 MG/DL (ref 74–99)
GLUCOSE URINE: NEGATIVE MG/DL
GLUCOSE URINE: NEGATIVE MG/DL
HBA1C MFR BLD: 5.2 % (ref 4–5.6)
HCT VFR BLD CALC: 31.1 % (ref 37–54)
HCT VFR BLD CALC: 32 % (ref 37–54)
HCT VFR BLD CALC: 34 % (ref 37–54)
HCT VFR BLD CALC: 36.6 % (ref 37–54)
HCT VFR BLD CALC: 37.5 % (ref 37–54)
HCT VFR BLD CALC: 38.4 % (ref 37–54)
HCT VFR BLD CALC: 39.1 % (ref 37–54)
HCT VFR BLD CALC: 40.7 % (ref 37–54)
HCT VFR BLD CALC: 41.9 % (ref 37–54)
HCT VFR BLD CALC: 42.8 % (ref 37–54)
HCT VFR BLD CALC: 45.1 % (ref 37–54)
HDLC SERPL-MCNC: 28 MG/DL
HDLC SERPL-MCNC: 38 MG/DL
HEMOGLOBIN: 11.3 G/DL (ref 12.5–16.5)
HEMOGLOBIN: 11.8 G/DL (ref 12.5–16.5)
HEMOGLOBIN: 12 G/DL (ref 12.5–16.5)
HEMOGLOBIN: 12 G/DL (ref 12.5–16.5)
HEMOGLOBIN: 12.7 G/DL (ref 12.5–16.5)
HEMOGLOBIN: 12.7 G/DL (ref 12.5–16.5)
HEMOGLOBIN: 13.2 G/DL (ref 12.5–16.5)
HEMOGLOBIN: 14 G/DL (ref 12.5–16.5)
HEMOGLOBIN: 8.6 G/DL (ref 12.5–16.5)
HEMOGLOBIN: 8.8 G/DL (ref 12.5–16.5)
HEMOGLOBIN: 9.8 G/DL (ref 12.5–16.5)
HYPOCHROMIA: ABNORMAL
HYPOCHROMIA: ABNORMAL
IMMATURE GRANULOCYTES #: 0.11 E9/L
IMMATURE GRANULOCYTES #: 0.15 E9/L
IMMATURE GRANULOCYTES #: 0.17 E9/L
IMMATURE GRANULOCYTES #: 0.18 E9/L
IMMATURE GRANULOCYTES #: 0.18 E9/L
IMMATURE GRANULOCYTES #: 0.21 E9/L
IMMATURE GRANULOCYTES #: 0.23 E9/L
IMMATURE GRANULOCYTES #: 0.24 E9/L
IMMATURE GRANULOCYTES %: 1.1 % (ref 0–5)
IMMATURE GRANULOCYTES %: 1.3 % (ref 0–5)
IMMATURE GRANULOCYTES %: 1.4 % (ref 0–5)
IMMATURE GRANULOCYTES %: 1.5 % (ref 0–5)
IMMATURE GRANULOCYTES %: 1.5 % (ref 0–5)
IMMATURE GRANULOCYTES %: 1.8 % (ref 0–5)
IMMATURE GRANULOCYTES %: 1.9 % (ref 0–5)
IMMATURE GRANULOCYTES %: 1.9 % (ref 0–5)
INR BLD: 1.4
INR BLD: 2.2
IRON SATURATION: 27 % (ref 20–55)
IRON: 36 MCG/DL (ref 59–158)
KETONES, URINE: NEGATIVE MG/DL
KETONES, URINE: NEGATIVE MG/DL
LACTATE DEHYDROGENASE: 199 U/L (ref 135–225)
LACTIC ACID, SEPSIS: 1.1 MMOL/L (ref 0.5–1.9)
LACTIC ACID, SEPSIS: 1.2 MMOL/L (ref 0.5–1.9)
LACTIC ACID, SEPSIS: 1.9 MMOL/L (ref 0.5–1.9)
LACTIC ACID, SEPSIS: 2.3 MMOL/L (ref 0.5–1.9)
LDL CHOLESTEROL CALCULATED: 52 MG/DL (ref 0–99)
LDL CHOLESTEROL CALCULATED: 66 MG/DL (ref 0–99)
LEUKOCYTE ESTERASE, URINE: NEGATIVE
LEUKOCYTE ESTERASE, URINE: NEGATIVE
LV EF: 60 %
LVEF MODALITY: NORMAL
LYMPHOCYTES ABSOLUTE: 0.76 E9/L (ref 1.5–4)
LYMPHOCYTES ABSOLUTE: 0.83 E9/L (ref 1.5–4)
LYMPHOCYTES ABSOLUTE: 1.16 E9/L (ref 1.5–4)
LYMPHOCYTES ABSOLUTE: 1.25 E9/L (ref 1.5–4)
LYMPHOCYTES ABSOLUTE: 1.52 E9/L (ref 1.5–4)
LYMPHOCYTES ABSOLUTE: 1.59 E9/L (ref 1.5–4)
LYMPHOCYTES ABSOLUTE: 1.78 E9/L (ref 1.5–4)
LYMPHOCYTES ABSOLUTE: 1.95 E9/L (ref 1.5–4)
LYMPHOCYTES ABSOLUTE: 1.96 E9/L (ref 1.5–4)
LYMPHOCYTES RELATIVE PERCENT: 10.6 % (ref 20–42)
LYMPHOCYTES RELATIVE PERCENT: 12.1 % (ref 20–42)
LYMPHOCYTES RELATIVE PERCENT: 12.2 % (ref 20–42)
LYMPHOCYTES RELATIVE PERCENT: 12.7 % (ref 20–42)
LYMPHOCYTES RELATIVE PERCENT: 13.6 % (ref 20–42)
LYMPHOCYTES RELATIVE PERCENT: 15.1 % (ref 20–42)
LYMPHOCYTES RELATIVE PERCENT: 17.8 % (ref 20–42)
LYMPHOCYTES RELATIVE PERCENT: 7 % (ref 20–42)
LYMPHOCYTES RELATIVE PERCENT: 8.5 % (ref 20–42)
Lab: NORMAL
Lab: NORMAL
MAGNESIUM: 2.2 MG/DL (ref 1.6–2.6)
MAGNESIUM: 2.3 MG/DL (ref 1.6–2.6)
MAGNESIUM: 2.9 MG/DL (ref 1.6–2.6)
MCH RBC QN AUTO: 25.7 PG (ref 26–35)
MCH RBC QN AUTO: 26.1 PG (ref 26–35)
MCH RBC QN AUTO: 26.3 PG (ref 26–35)
MCH RBC QN AUTO: 26.3 PG (ref 26–35)
MCH RBC QN AUTO: 26.4 PG (ref 26–35)
MCH RBC QN AUTO: 26.4 PG (ref 26–35)
MCH RBC QN AUTO: 26.5 PG (ref 26–35)
MCH RBC QN AUTO: 26.8 PG (ref 26–35)
MCH RBC QN AUTO: 27.1 PG (ref 26–35)
MCH RBC QN AUTO: 27.3 PG (ref 26–35)
MCHC RBC AUTO-ENTMCNC: 26.9 % (ref 32–34.5)
MCHC RBC AUTO-ENTMCNC: 28.8 % (ref 32–34.5)
MCHC RBC AUTO-ENTMCNC: 30.3 % (ref 32–34.5)
MCHC RBC AUTO-ENTMCNC: 30.7 % (ref 32–34.5)
MCHC RBC AUTO-ENTMCNC: 30.8 % (ref 32–34.5)
MCHC RBC AUTO-ENTMCNC: 30.9 % (ref 32–34.5)
MCHC RBC AUTO-ENTMCNC: 31 % (ref 32–34.5)
MCHC RBC AUTO-ENTMCNC: 31.2 % (ref 32–34.5)
MCHC RBC AUTO-ENTMCNC: 31.3 % (ref 32–34.5)
MCHC RBC AUTO-ENTMCNC: 31.5 % (ref 32–34.5)
MCV RBC AUTO: 84.3 FL (ref 80–99.9)
MCV RBC AUTO: 84.3 FL (ref 80–99.9)
MCV RBC AUTO: 84.6 FL (ref 80–99.9)
MCV RBC AUTO: 85.9 FL (ref 80–99.9)
MCV RBC AUTO: 86.4 FL (ref 80–99.9)
MCV RBC AUTO: 86.7 FL (ref 80–99.9)
MCV RBC AUTO: 87.9 FL (ref 80–99.9)
MCV RBC AUTO: 88.4 FL (ref 80–99.9)
MCV RBC AUTO: 90.4 FL (ref 80–99.9)
MCV RBC AUTO: 95.8 FL (ref 80–99.9)
MONOCYTES ABSOLUTE: 0.33 E9/L (ref 0.1–0.95)
MONOCYTES ABSOLUTE: 0.54 E9/L (ref 0.1–0.95)
MONOCYTES ABSOLUTE: 0.62 E9/L (ref 0.1–0.95)
MONOCYTES ABSOLUTE: 0.69 E9/L (ref 0.1–0.95)
MONOCYTES ABSOLUTE: 0.7 E9/L (ref 0.1–0.95)
MONOCYTES ABSOLUTE: 0.7 E9/L (ref 0.1–0.95)
MONOCYTES ABSOLUTE: 0.8 E9/L (ref 0.1–0.95)
MONOCYTES ABSOLUTE: 0.8 E9/L (ref 0.1–0.95)
MONOCYTES ABSOLUTE: 0.89 E9/L (ref 0.1–0.95)
MONOCYTES RELATIVE PERCENT: 2.6 % (ref 2–12)
MONOCYTES RELATIVE PERCENT: 5.4 % (ref 2–12)
MONOCYTES RELATIVE PERCENT: 5.8 % (ref 2–12)
MONOCYTES RELATIVE PERCENT: 6 % (ref 2–12)
MONOCYTES RELATIVE PERCENT: 6.2 % (ref 2–12)
MONOCYTES RELATIVE PERCENT: 6.3 % (ref 2–12)
MONOCYTES RELATIVE PERCENT: 6.4 % (ref 2–12)
MONOCYTES RELATIVE PERCENT: 6.4 % (ref 2–12)
MONOCYTES RELATIVE PERCENT: 6.7 % (ref 2–12)
MYELOCYTE PERCENT: 1.8 % (ref 0–0)
NEUTROPHILS ABSOLUTE: 12.24 E9/L (ref 1.8–7.3)
NEUTROPHILS ABSOLUTE: 7.3 E9/L (ref 1.8–7.3)
NEUTROPHILS ABSOLUTE: 7.78 E9/L (ref 1.8–7.3)
NEUTROPHILS ABSOLUTE: 7.95 E9/L (ref 1.8–7.3)
NEUTROPHILS ABSOLUTE: 8.45 E9/L (ref 1.8–7.3)
NEUTROPHILS ABSOLUTE: 9.01 E9/L (ref 1.8–7.3)
NEUTROPHILS ABSOLUTE: 9.57 E9/L (ref 1.8–7.3)
NEUTROPHILS ABSOLUTE: 9.81 E9/L (ref 1.8–7.3)
NEUTROPHILS ABSOLUTE: 9.89 E9/L (ref 1.8–7.3)
NEUTROPHILS RELATIVE PERCENT: 72.9 % (ref 43–80)
NEUTROPHILS RELATIVE PERCENT: 75 % (ref 43–80)
NEUTROPHILS RELATIVE PERCENT: 76.5 % (ref 43–80)
NEUTROPHILS RELATIVE PERCENT: 76.8 % (ref 43–80)
NEUTROPHILS RELATIVE PERCENT: 76.8 % (ref 43–80)
NEUTROPHILS RELATIVE PERCENT: 77.3 % (ref 43–80)
NEUTROPHILS RELATIVE PERCENT: 79.5 % (ref 43–80)
NEUTROPHILS RELATIVE PERCENT: 81.3 % (ref 43–80)
NEUTROPHILS RELATIVE PERCENT: 88.6 % (ref 43–80)
NITRITE, URINE: NEGATIVE
NITRITE, URINE: NEGATIVE
NUCLEATED RED BLOOD CELLS: 0.9 /100 WBC
OVALOCYTES: ABNORMAL
OVALOCYTES: ABNORMAL
PDW BLD-RTO: 15.6 FL (ref 11.5–15)
PDW BLD-RTO: 15.8 FL (ref 11.5–15)
PDW BLD-RTO: 15.9 FL (ref 11.5–15)
PDW BLD-RTO: 16.1 FL (ref 11.5–15)
PDW BLD-RTO: 16.3 FL (ref 11.5–15)
PDW BLD-RTO: 16.3 FL (ref 11.5–15)
PDW BLD-RTO: 16.4 FL (ref 11.5–15)
PDW BLD-RTO: 16.5 FL (ref 11.5–15)
PDW BLD-RTO: 16.6 FL (ref 11.5–15)
PDW BLD-RTO: 16.7 FL (ref 11.5–15)
PH UA: 6 (ref 5–9)
PH UA: 6 (ref 5–9)
PHOSPHORUS: 6 MG/DL (ref 2.5–4.5)
PLATELET # BLD: 209 E9/L (ref 130–450)
PLATELET # BLD: 224 E9/L (ref 130–450)
PLATELET # BLD: 234 E9/L (ref 130–450)
PLATELET # BLD: 236 E9/L (ref 130–450)
PLATELET # BLD: 239 E9/L (ref 130–450)
PLATELET # BLD: 259 E9/L (ref 130–450)
PLATELET # BLD: 263 E9/L (ref 130–450)
PLATELET # BLD: 269 E9/L (ref 130–450)
PLATELET # BLD: 273 E9/L (ref 130–450)
PLATELET # BLD: 293 E9/L (ref 130–450)
PMV BLD AUTO: 10 FL (ref 7–12)
PMV BLD AUTO: 10.5 FL (ref 7–12)
PMV BLD AUTO: 9.1 FL (ref 7–12)
PMV BLD AUTO: 9.3 FL (ref 7–12)
PMV BLD AUTO: 9.7 FL (ref 7–12)
PMV BLD AUTO: 9.7 FL (ref 7–12)
PMV BLD AUTO: 9.8 FL (ref 7–12)
PMV BLD AUTO: 9.8 FL (ref 7–12)
PMV BLD AUTO: 9.9 FL (ref 7–12)
PMV BLD AUTO: 9.9 FL (ref 7–12)
POIKILOCYTES: ABNORMAL
POIKILOCYTES: ABNORMAL
POLYCHROMASIA: ABNORMAL
POLYCHROMASIA: ABNORMAL
POTASSIUM REFLEX MAGNESIUM: 5.3 MMOL/L (ref 3.5–5)
POTASSIUM SERPL-SCNC: 3.8 MMOL/L (ref 3.5–5)
POTASSIUM SERPL-SCNC: 4.3 MMOL/L (ref 3.5–5)
POTASSIUM SERPL-SCNC: 4.4 MMOL/L (ref 3.5–5)
POTASSIUM SERPL-SCNC: 4.5 MMOL/L (ref 3.5–5)
POTASSIUM SERPL-SCNC: 4.7 MMOL/L (ref 3.5–5)
POTASSIUM SERPL-SCNC: 4.9 MMOL/L (ref 3.5–5)
POTASSIUM SERPL-SCNC: 5 MMOL/L (ref 3.5–5)
POTASSIUM SERPL-SCNC: 5 MMOL/L (ref 3.5–5)
POTASSIUM SERPL-SCNC: 5.2 MMOL/L (ref 3.5–5)
POTASSIUM SERPL-SCNC: 6.2 MMOL/L (ref 3.5–5)
PRO-BNP: 5104 PG/ML (ref 0–450)
PRO-BNP: 8473 PG/ML (ref 0–450)
PRO-BNP: ABNORMAL PG/ML (ref 0–450)
PROCALCITONIN: 0.4 NG/ML (ref 0–0.08)
PROTEIN UA: ABNORMAL MG/DL
PROTEIN UA: ABNORMAL MG/DL
PROTHROMBIN TIME: 15.1 SEC (ref 9.3–12.4)
PROTHROMBIN TIME: 24.6 SEC (ref 9.3–12.4)
RBC # BLD: 3.34 E12/L (ref 3.8–5.8)
RBC # BLD: 3.76 E12/L (ref 3.8–5.8)
RBC # BLD: 4.14 E12/L (ref 3.8–5.8)
RBC # BLD: 4.45 E12/L (ref 3.8–5.8)
RBC # BLD: 4.54 E12/L (ref 3.8–5.8)
RBC # BLD: 4.55 E12/L (ref 3.8–5.8)
RBC # BLD: 4.83 E12/L (ref 3.8–5.8)
RBC # BLD: 4.83 E12/L (ref 3.8–5.8)
RBC # BLD: 4.87 E12/L (ref 3.8–5.8)
RBC # BLD: 5.22 E12/L (ref 3.8–5.8)
RBC UA: ABNORMAL /HPF (ref 0–2)
RBC UA: ABNORMAL /HPF (ref 0–2)
REPORT: NORMAL
REPORT: NORMAL
SARS-COV-2, NAAT: NOT DETECTED
SODIUM BLD-SCNC: 135 MMOL/L (ref 132–146)
SODIUM BLD-SCNC: 136 MMOL/L (ref 132–146)
SODIUM BLD-SCNC: 138 MMOL/L (ref 132–146)
SODIUM BLD-SCNC: 138 MMOL/L (ref 132–146)
SODIUM BLD-SCNC: 139 MMOL/L (ref 132–146)
SODIUM BLD-SCNC: 140 MMOL/L (ref 132–146)
SODIUM BLD-SCNC: 141 MMOL/L (ref 132–146)
SODIUM BLD-SCNC: 142 MMOL/L (ref 132–146)
SODIUM BLD-SCNC: 143 MMOL/L (ref 132–146)
SPECIFIC GRAVITY UA: 1.01 (ref 1–1.03)
SPECIFIC GRAVITY UA: 1.02 (ref 1–1.03)
THIS TEST SENT TO: NORMAL
THIS TEST SENT TO: NORMAL
TOTAL IRON BINDING CAPACITY: 134 MCG/DL (ref 250–450)
TOTAL PROTEIN: 5.6 G/DL (ref 6.4–8.3)
TOTAL PROTEIN: 5.6 G/DL (ref 6.4–8.3)
TOTAL PROTEIN: 5.9 G/DL (ref 6.4–8.3)
TOTAL PROTEIN: 6 G/DL (ref 6.4–8.3)
TOTAL PROTEIN: 6 G/DL (ref 6.4–8.3)
TOTAL PROTEIN: 6.3 G/DL (ref 6.4–8.3)
TOTAL PROTEIN: 6.4 G/DL (ref 6.4–8.3)
TOTAL PROTEIN: 7.2 G/DL (ref 6.4–8.3)
TOTAL PROTEIN: 7.2 G/DL (ref 6.4–8.3)
TOTAL PROTEIN: 7.4 G/DL (ref 6.4–8.3)
TRIGL SERPL-MCNC: 122 MG/DL (ref 0–149)
TRIGL SERPL-MCNC: 79 MG/DL (ref 0–149)
TROPONIN, HIGH SENSITIVITY: 143 NG/L (ref 0–11)
TROPONIN, HIGH SENSITIVITY: 201 NG/L (ref 0–11)
TROPONIN, HIGH SENSITIVITY: 223 NG/L (ref 0–11)
TROPONIN, HIGH SENSITIVITY: 249 NG/L (ref 0–11)
TROPONIN, HIGH SENSITIVITY: 35 NG/L (ref 0–11)
TROPONIN, HIGH SENSITIVITY: 37 NG/L (ref 0–11)
TSH SERPL DL<=0.05 MIU/L-ACNC: 1.28 UIU/ML (ref 0.27–4.2)
URIC ACID, SERUM: 7.5 MG/DL (ref 3.4–7)
URINE CULTURE, ROUTINE: NORMAL
UROBILINOGEN, URINE: 4 E.U./DL
UROBILINOGEN, URINE: >=8 E.U./DL
VLDLC SERPL CALC-MCNC: 16 MG/DL
VLDLC SERPL CALC-MCNC: 24 MG/DL
WBC # BLD: 10 E9/L (ref 4.5–11.5)
WBC # BLD: 10.4 E9/L (ref 4.5–11.5)
WBC # BLD: 10.4 E9/L (ref 4.5–11.5)
WBC # BLD: 10.9 E9/L (ref 4.5–11.5)
WBC # BLD: 10.9 E9/L (ref 4.5–11.5)
WBC # BLD: 11.7 E9/L (ref 4.5–11.5)
WBC # BLD: 12.5 E9/L (ref 4.5–11.5)
WBC # BLD: 12.9 E9/L (ref 4.5–11.5)
WBC # BLD: 15.4 E9/L (ref 4.5–11.5)
WBC # BLD: 9.8 E9/L (ref 4.5–11.5)
WBC UA: ABNORMAL /HPF (ref 0–5)
WBC UA: ABNORMAL /HPF (ref 0–5)

## 2022-01-01 PROCEDURE — 83880 ASSAY OF NATRIURETIC PEPTIDE: CPT

## 2022-01-01 PROCEDURE — 2500000003 HC RX 250 WO HCPCS: Performed by: INTERNAL MEDICINE

## 2022-01-01 PROCEDURE — 96375 TX/PRO/DX INJ NEW DRUG ADDON: CPT

## 2022-01-01 PROCEDURE — 99213 OFFICE O/P EST LOW 20 MIN: CPT

## 2022-01-01 PROCEDURE — 83036 HEMOGLOBIN GLYCOSYLATED A1C: CPT

## 2022-01-01 PROCEDURE — 85027 COMPLETE CBC AUTOMATED: CPT

## 2022-01-01 PROCEDURE — 99285 EMERGENCY DEPT VISIT HI MDM: CPT

## 2022-01-01 PROCEDURE — 97530 THERAPEUTIC ACTIVITIES: CPT

## 2022-01-01 PROCEDURE — 6370000000 HC RX 637 (ALT 250 FOR IP): Performed by: NURSE PRACTITIONER

## 2022-01-01 PROCEDURE — 84100 ASSAY OF PHOSPHORUS: CPT

## 2022-01-01 PROCEDURE — 99203 OFFICE O/P NEW LOW 30 MIN: CPT | Performed by: NURSE PRACTITIONER

## 2022-01-01 PROCEDURE — 99231 SBSQ HOSP IP/OBS SF/LOW 25: CPT | Performed by: NURSE PRACTITIONER

## 2022-01-01 PROCEDURE — 2060000000 HC ICU INTERMEDIATE R&B

## 2022-01-01 PROCEDURE — 85025 COMPLETE CBC W/AUTO DIFF WBC: CPT

## 2022-01-01 PROCEDURE — 6360000002 HC RX W HCPCS: Performed by: NURSE PRACTITIONER

## 2022-01-01 PROCEDURE — 2500000003 HC RX 250 WO HCPCS: Performed by: PHYSICIAN ASSISTANT

## 2022-01-01 PROCEDURE — P9047 ALBUMIN (HUMAN), 25%, 50ML: HCPCS | Performed by: INTERNAL MEDICINE

## 2022-01-01 PROCEDURE — 83735 ASSAY OF MAGNESIUM: CPT

## 2022-01-01 PROCEDURE — 1123F ACP DISCUSS/DSCN MKR DOCD: CPT | Performed by: INTERNAL MEDICINE

## 2022-01-01 PROCEDURE — 2580000003 HC RX 258: Performed by: PHYSICIAN ASSISTANT

## 2022-01-01 PROCEDURE — 71045 X-RAY EXAM CHEST 1 VIEW: CPT

## 2022-01-01 PROCEDURE — 2580000003 HC RX 258: Performed by: INTERNAL MEDICINE

## 2022-01-01 PROCEDURE — 6360000002 HC RX W HCPCS: Performed by: INTERNAL MEDICINE

## 2022-01-01 PROCEDURE — 99233 SBSQ HOSP IP/OBS HIGH 50: CPT | Performed by: INTERNAL MEDICINE

## 2022-01-01 PROCEDURE — 36415 COLL VENOUS BLD VENIPUNCTURE: CPT

## 2022-01-01 PROCEDURE — 87040 BLOOD CULTURE FOR BACTERIA: CPT

## 2022-01-01 PROCEDURE — 82248 BILIRUBIN DIRECT: CPT

## 2022-01-01 PROCEDURE — 2700000000 HC OXYGEN THERAPY PER DAY

## 2022-01-01 PROCEDURE — 99213 OFFICE O/P EST LOW 20 MIN: CPT | Performed by: INTERNAL MEDICINE

## 2022-01-01 PROCEDURE — 2580000003 HC RX 258: Performed by: NURSE PRACTITIONER

## 2022-01-01 PROCEDURE — 84145 PROCALCITONIN (PCT): CPT

## 2022-01-01 PROCEDURE — 85014 HEMATOCRIT: CPT

## 2022-01-01 PROCEDURE — 83550 IRON BINDING TEST: CPT

## 2022-01-01 PROCEDURE — 2140000000 HC CCU INTERMEDIATE R&B

## 2022-01-01 PROCEDURE — G8428 CUR MEDS NOT DOCUMENT: HCPCS | Performed by: NURSE PRACTITIONER

## 2022-01-01 PROCEDURE — 80053 COMPREHEN METABOLIC PANEL: CPT

## 2022-01-01 PROCEDURE — 97161 PT EVAL LOW COMPLEX 20 MIN: CPT

## 2022-01-01 PROCEDURE — 85730 THROMBOPLASTIN TIME PARTIAL: CPT

## 2022-01-01 PROCEDURE — 6370000000 HC RX 637 (ALT 250 FOR IP): Performed by: INTERNAL MEDICINE

## 2022-01-01 PROCEDURE — 1036F TOBACCO NON-USER: CPT | Performed by: INTERNAL MEDICINE

## 2022-01-01 PROCEDURE — 93005 ELECTROCARDIOGRAM TRACING: CPT | Performed by: NURSE PRACTITIONER

## 2022-01-01 PROCEDURE — 93306 TTE W/DOPPLER COMPLETE: CPT

## 2022-01-01 PROCEDURE — 99214 OFFICE O/P EST MOD 30 MIN: CPT | Performed by: INTERNAL MEDICINE

## 2022-01-01 PROCEDURE — 81001 URINALYSIS AUTO W/SCOPE: CPT

## 2022-01-01 PROCEDURE — G8427 DOCREV CUR MEDS BY ELIG CLIN: HCPCS | Performed by: INTERNAL MEDICINE

## 2022-01-01 PROCEDURE — 80061 LIPID PANEL: CPT

## 2022-01-01 PROCEDURE — 6370000000 HC RX 637 (ALT 250 FOR IP)

## 2022-01-01 PROCEDURE — 80048 BASIC METABOLIC PNL TOTAL CA: CPT

## 2022-01-01 PROCEDURE — 71275 CT ANGIOGRAPHY CHEST: CPT

## 2022-01-01 PROCEDURE — 1123F ACP DISCUSS/DSCN MKR DOCD: CPT | Performed by: NURSE PRACTITIONER

## 2022-01-01 PROCEDURE — 51702 INSERT TEMP BLADDER CATH: CPT

## 2022-01-01 PROCEDURE — 84443 ASSAY THYROID STIM HORMONE: CPT

## 2022-01-01 PROCEDURE — 96413 CHEMO IV INFUSION 1 HR: CPT

## 2022-01-01 PROCEDURE — 6370000000 HC RX 637 (ALT 250 FOR IP): Performed by: STUDENT IN AN ORGANIZED HEALTH CARE EDUCATION/TRAINING PROGRAM

## 2022-01-01 PROCEDURE — 97165 OT EVAL LOW COMPLEX 30 MIN: CPT

## 2022-01-01 PROCEDURE — 6360000004 HC RX CONTRAST MEDICATION: Performed by: RADIOLOGY

## 2022-01-01 PROCEDURE — 99205 OFFICE O/P NEW HI 60 MIN: CPT | Performed by: INTERNAL MEDICINE

## 2022-01-01 PROCEDURE — 83540 ASSAY OF IRON: CPT

## 2022-01-01 PROCEDURE — 2500000003 HC RX 250 WO HCPCS: Performed by: NURSE PRACTITIONER

## 2022-01-01 PROCEDURE — 6360000002 HC RX W HCPCS: Performed by: STUDENT IN AN ORGANIZED HEALTH CARE EDUCATION/TRAINING PROGRAM

## 2022-01-01 PROCEDURE — G8417 CALC BMI ABV UP PARAM F/U: HCPCS | Performed by: INTERNAL MEDICINE

## 2022-01-01 PROCEDURE — 87635 SARS-COV-2 COVID-19 AMP PRB: CPT

## 2022-01-01 PROCEDURE — 96374 THER/PROPH/DIAG INJ IV PUSH: CPT

## 2022-01-01 PROCEDURE — 6360000002 HC RX W HCPCS: Performed by: EMERGENCY MEDICINE

## 2022-01-01 PROCEDURE — 83605 ASSAY OF LACTIC ACID: CPT

## 2022-01-01 PROCEDURE — G8417 CALC BMI ABV UP PARAM F/U: HCPCS | Performed by: NURSE PRACTITIONER

## 2022-01-01 PROCEDURE — 87088 URINE BACTERIA CULTURE: CPT

## 2022-01-01 PROCEDURE — 70450 CT HEAD/BRAIN W/O DYE: CPT

## 2022-01-01 PROCEDURE — 1036F TOBACCO NON-USER: CPT | Performed by: NURSE PRACTITIONER

## 2022-01-01 PROCEDURE — 99223 1ST HOSP IP/OBS HIGH 75: CPT | Performed by: INTERNAL MEDICINE

## 2022-01-01 PROCEDURE — 80076 HEPATIC FUNCTION PANEL: CPT

## 2022-01-01 PROCEDURE — 93005 ELECTROCARDIOGRAM TRACING: CPT | Performed by: EMERGENCY MEDICINE

## 2022-01-01 PROCEDURE — 84484 ASSAY OF TROPONIN QUANT: CPT

## 2022-01-01 PROCEDURE — 99231 SBSQ HOSP IP/OBS SF/LOW 25: CPT

## 2022-01-01 PROCEDURE — 85018 HEMOGLOBIN: CPT

## 2022-01-01 PROCEDURE — 93005 ELECTROCARDIOGRAM TRACING: CPT | Performed by: STUDENT IN AN ORGANIZED HEALTH CARE EDUCATION/TRAINING PROGRAM

## 2022-01-01 PROCEDURE — 99222 1ST HOSP IP/OBS MODERATE 55: CPT

## 2022-01-01 PROCEDURE — 85610 PROTHROMBIN TIME: CPT

## 2022-01-01 PROCEDURE — 84550 ASSAY OF BLOOD/URIC ACID: CPT

## 2022-01-01 RX ORDER — FUROSEMIDE 10 MG/ML
40 INJECTION INTRAMUSCULAR; INTRAVENOUS ONCE
Status: COMPLETED | OUTPATIENT
Start: 2022-01-01 | End: 2022-01-01

## 2022-01-01 RX ORDER — ENOXAPARIN SODIUM 100 MG/ML
50 INJECTION SUBCUTANEOUS ONCE
Status: COMPLETED | OUTPATIENT
Start: 2022-01-01 | End: 2022-01-01

## 2022-01-01 RX ORDER — ONDANSETRON 2 MG/ML
8 INJECTION INTRAMUSCULAR; INTRAVENOUS
Status: CANCELLED | OUTPATIENT
Start: 2022-01-01

## 2022-01-01 RX ORDER — TRAMADOL HYDROCHLORIDE 50 MG/1
25 TABLET ORAL EVERY 6 HOURS PRN
Status: DISCONTINUED | OUTPATIENT
Start: 2022-01-01 | End: 2022-01-01 | Stop reason: HOSPADM

## 2022-01-01 RX ORDER — ALBUTEROL SULFATE 90 UG/1
4 AEROSOL, METERED RESPIRATORY (INHALATION) PRN
Status: CANCELLED | OUTPATIENT
Start: 2022-01-01

## 2022-01-01 RX ORDER — SENNA PLUS 8.6 MG/1
1 TABLET ORAL 2 TIMES DAILY PRN
Status: DISCONTINUED | OUTPATIENT
Start: 2022-01-01 | End: 2022-01-01 | Stop reason: HOSPADM

## 2022-01-01 RX ORDER — NITROGLYCERIN 0.4 MG/1
0.4 TABLET SUBLINGUAL EVERY 5 MIN PRN
COMMUNITY
Start: 2022-01-01

## 2022-01-01 RX ORDER — TRAMADOL HYDROCHLORIDE 50 MG/1
25 TABLET ORAL EVERY 6 HOURS PRN
COMMUNITY
Start: 2022-01-01 | End: 2022-01-01

## 2022-01-01 RX ORDER — SODIUM CHLORIDE 9 MG/ML
INJECTION, SOLUTION INTRAVENOUS CONTINUOUS
Status: ACTIVE | OUTPATIENT
Start: 2022-01-01 | End: 2022-01-01

## 2022-01-01 RX ORDER — ACETAMINOPHEN 325 MG/1
650 TABLET ORAL EVERY 6 HOURS PRN
Status: DISCONTINUED | OUTPATIENT
Start: 2022-01-01 | End: 2022-01-01

## 2022-01-01 RX ORDER — BUMETANIDE 0.25 MG/ML
1 INJECTION, SOLUTION INTRAMUSCULAR; INTRAVENOUS ONCE
Status: COMPLETED | OUTPATIENT
Start: 2022-01-01 | End: 2022-01-01

## 2022-01-01 RX ORDER — ASPIRIN 81 MG/1
324 TABLET, CHEWABLE ORAL ONCE
Status: COMPLETED | OUTPATIENT
Start: 2022-01-01 | End: 2022-01-01

## 2022-01-01 RX ORDER — ALBUMIN (HUMAN) 12.5 G/50ML
50 SOLUTION INTRAVENOUS ONCE
Status: COMPLETED | OUTPATIENT
Start: 2022-01-01 | End: 2022-01-01

## 2022-01-01 RX ORDER — SODIUM CHLORIDE 9 MG/ML
5-40 INJECTION INTRAVENOUS PRN
Status: CANCELLED | OUTPATIENT
Start: 2022-01-01

## 2022-01-01 RX ORDER — 0.9 % SODIUM CHLORIDE 0.9 %
1000 INTRAVENOUS SOLUTION INTRAVENOUS ONCE
Status: COMPLETED | OUTPATIENT
Start: 2022-01-01 | End: 2022-01-01

## 2022-01-01 RX ORDER — BUMETANIDE 0.25 MG/ML
0.5 INJECTION, SOLUTION INTRAMUSCULAR; INTRAVENOUS DAILY
Status: DISCONTINUED | OUTPATIENT
Start: 2022-01-01 | End: 2022-01-01

## 2022-01-01 RX ORDER — GLYCOPYRROLATE 0.2 MG/ML
0.4 INJECTION INTRAMUSCULAR; INTRAVENOUS 4 TIMES DAILY
Status: DISCONTINUED | OUTPATIENT
Start: 2022-01-01 | End: 2022-01-01 | Stop reason: HOSPADM

## 2022-01-01 RX ORDER — TRAMADOL HYDROCHLORIDE 50 MG/1
50 TABLET ORAL EVERY 6 HOURS PRN
Qty: 28 TABLET | Refills: 0 | Status: ON HOLD
Start: 2022-01-01 | End: 2022-01-01 | Stop reason: HOSPADM

## 2022-01-01 RX ORDER — SODIUM CHLORIDE 9 MG/ML
INJECTION, SOLUTION INTRAVENOUS CONTINUOUS
Status: DISCONTINUED | OUTPATIENT
Start: 2022-01-01 | End: 2022-01-01

## 2022-01-01 RX ORDER — SENNA PLUS 8.6 MG/1
1 TABLET ORAL NIGHTLY
Status: DISCONTINUED | OUTPATIENT
Start: 2022-01-01 | End: 2022-01-01 | Stop reason: HOSPADM

## 2022-01-01 RX ORDER — HEPARIN SODIUM (PORCINE) LOCK FLUSH IV SOLN 100 UNIT/ML 100 UNIT/ML
500 SOLUTION INTRAVENOUS PRN
Status: CANCELLED | OUTPATIENT
Start: 2022-01-01

## 2022-01-01 RX ORDER — SODIUM CHLORIDE 0.9 % (FLUSH) 0.9 %
5-40 SYRINGE (ML) INJECTION PRN
Status: DISCONTINUED | OUTPATIENT
Start: 2022-01-01 | End: 2022-01-01 | Stop reason: HOSPADM

## 2022-01-01 RX ORDER — IPRATROPIUM BROMIDE AND ALBUTEROL SULFATE 2.5; .5 MG/3ML; MG/3ML
1 SOLUTION RESPIRATORY (INHALATION) EVERY 4 HOURS PRN
Status: DISCONTINUED | OUTPATIENT
Start: 2022-01-01 | End: 2022-01-01 | Stop reason: HOSPADM

## 2022-01-01 RX ORDER — SENNA PLUS 8.6 MG/1
1 TABLET ORAL DAILY PRN
Status: DISCONTINUED | OUTPATIENT
Start: 2022-01-01 | End: 2022-01-01

## 2022-01-01 RX ORDER — SODIUM CHLORIDE 9 MG/ML
INJECTION, SOLUTION INTRAVENOUS CONTINUOUS
Status: CANCELLED | OUTPATIENT
Start: 2022-01-01

## 2022-01-01 RX ORDER — METOPROLOL TARTRATE 5 MG/5ML
5 INJECTION INTRAVENOUS EVERY 8 HOURS
Status: DISCONTINUED | OUTPATIENT
Start: 2022-01-01 | End: 2022-01-01

## 2022-01-01 RX ORDER — DIPHENHYDRAMINE HYDROCHLORIDE 50 MG/ML
50 INJECTION INTRAMUSCULAR; INTRAVENOUS
Status: CANCELLED | OUTPATIENT
Start: 2022-01-01

## 2022-01-01 RX ORDER — ASCORBIC ACID 500 MG
500 TABLET ORAL 2 TIMES DAILY
COMMUNITY

## 2022-01-01 RX ORDER — SODIUM CHLORIDE 9 MG/ML
5-250 INJECTION, SOLUTION INTRAVENOUS PRN
Status: DISCONTINUED | OUTPATIENT
Start: 2022-01-01 | End: 2022-01-01 | Stop reason: HOSPADM

## 2022-01-01 RX ORDER — LORAZEPAM 1 MG/1
TABLET ORAL
COMMUNITY
Start: 2022-01-01 | End: 2022-01-01

## 2022-01-01 RX ORDER — ACETAMINOPHEN 325 MG/1
650 TABLET ORAL
Status: CANCELLED | OUTPATIENT
Start: 2022-01-01

## 2022-01-01 RX ORDER — TAMSULOSIN HYDROCHLORIDE 0.4 MG/1
0.4 CAPSULE ORAL NIGHTLY
COMMUNITY

## 2022-01-01 RX ORDER — METOPROLOL SUCCINATE 25 MG/1
25 TABLET, EXTENDED RELEASE ORAL 2 TIMES DAILY
Qty: 30 TABLET | Refills: 3 | Status: SHIPPED | OUTPATIENT
Start: 2022-01-01

## 2022-01-01 RX ORDER — PRAVASTATIN SODIUM 20 MG
40 TABLET ORAL DAILY
Status: DISCONTINUED | OUTPATIENT
Start: 2022-01-01 | End: 2022-01-01 | Stop reason: HOSPADM

## 2022-01-01 RX ORDER — MIDAZOLAM HYDROCHLORIDE 2 MG/2ML
2 INJECTION, SOLUTION INTRAMUSCULAR; INTRAVENOUS ONCE
Status: COMPLETED | OUTPATIENT
Start: 2022-01-01 | End: 2022-01-01

## 2022-01-01 RX ORDER — FUROSEMIDE 10 MG/ML
20 INJECTION INTRAMUSCULAR; INTRAVENOUS ONCE
Status: COMPLETED | OUTPATIENT
Start: 2022-01-01 | End: 2022-01-01

## 2022-01-01 RX ORDER — SODIUM CHLORIDE 0.9 % (FLUSH) 0.9 %
5-40 SYRINGE (ML) INJECTION EVERY 12 HOURS SCHEDULED
Status: DISCONTINUED | OUTPATIENT
Start: 2022-01-01 | End: 2022-01-01 | Stop reason: HOSPADM

## 2022-01-01 RX ORDER — SODIUM CHLORIDE 9 MG/ML
INJECTION, SOLUTION INTRAVENOUS PRN
Status: DISCONTINUED | OUTPATIENT
Start: 2022-01-01 | End: 2022-01-01

## 2022-01-01 RX ORDER — FAMOTIDINE 10 MG/ML
20 INJECTION, SOLUTION INTRAVENOUS
Status: CANCELLED | OUTPATIENT
Start: 2022-01-01

## 2022-01-01 RX ORDER — BISACODYL 10 MG
10 SUPPOSITORY, RECTAL RECTAL DAILY PRN
COMMUNITY

## 2022-01-01 RX ORDER — ENOXAPARIN SODIUM 100 MG/ML
30 INJECTION SUBCUTANEOUS DAILY
Status: DISCONTINUED | OUTPATIENT
Start: 2022-01-01 | End: 2022-01-01

## 2022-01-01 RX ORDER — SODIUM CHLORIDE 0.9 % (FLUSH) 0.9 %
5-40 SYRINGE (ML) INJECTION PRN
Status: CANCELLED | OUTPATIENT
Start: 2022-01-01

## 2022-01-01 RX ORDER — METOPROLOL SUCCINATE 25 MG/1
25 TABLET, EXTENDED RELEASE ORAL 2 TIMES DAILY
Status: DISCONTINUED | OUTPATIENT
Start: 2022-01-01 | End: 2022-01-01 | Stop reason: HOSPADM

## 2022-01-01 RX ORDER — ONDANSETRON 2 MG/ML
8 INJECTION INTRAMUSCULAR; INTRAVENOUS
Status: COMPLETED | OUTPATIENT
Start: 2022-01-01 | End: 2022-01-01

## 2022-01-01 RX ORDER — ACETAMINOPHEN 650 MG/1
650 SUPPOSITORY RECTAL EVERY 6 HOURS PRN
Status: DISCONTINUED | OUTPATIENT
Start: 2022-01-01 | End: 2022-01-01

## 2022-01-01 RX ORDER — AMOXICILLIN AND CLAVULANATE POTASSIUM 875; 125 MG/1; MG/1
1 TABLET, FILM COATED ORAL 2 TIMES DAILY
Qty: 14 TABLET | Refills: 0 | Status: SHIPPED | OUTPATIENT
Start: 2022-01-01 | End: 2022-01-01

## 2022-01-01 RX ORDER — POLYETHYLENE GLYCOL 3350 17 G/17G
17 POWDER, FOR SOLUTION ORAL DAILY PRN
Status: DISCONTINUED | OUTPATIENT
Start: 2022-01-01 | End: 2022-01-01 | Stop reason: HOSPADM

## 2022-01-01 RX ORDER — TRAMADOL HYDROCHLORIDE 50 MG/1
25 TABLET ORAL EVERY 6 HOURS PRN
Qty: 30 TABLET | Refills: 0 | Status: SHIPPED | OUTPATIENT
Start: 2022-01-01 | End: 2022-01-01

## 2022-01-01 RX ORDER — FENTANYL CITRATE 50 UG/ML
50 INJECTION, SOLUTION INTRAMUSCULAR; INTRAVENOUS
Status: DISCONTINUED | OUTPATIENT
Start: 2022-01-01 | End: 2022-01-01

## 2022-01-01 RX ORDER — ASPIRIN 81 MG/1
81 TABLET ORAL DAILY
Status: DISCONTINUED | OUTPATIENT
Start: 2022-01-01 | End: 2022-01-01 | Stop reason: HOSPADM

## 2022-01-01 RX ORDER — M-VIT,TX,IRON,MINS/CALC/FOLIC 27MG-0.4MG
1 TABLET ORAL DAILY
Status: DISCONTINUED | OUTPATIENT
Start: 2022-01-01 | End: 2022-01-01

## 2022-01-01 RX ORDER — MIDODRINE HYDROCHLORIDE 5 MG/1
2.5 TABLET ORAL 3 TIMES DAILY
Status: DISCONTINUED | OUTPATIENT
Start: 2022-01-01 | End: 2022-01-01

## 2022-01-01 RX ORDER — LORAZEPAM 2 MG/ML
1 INJECTION INTRAMUSCULAR EVERY 6 HOURS PRN
Status: DISCONTINUED | OUTPATIENT
Start: 2022-01-01 | End: 2022-01-01 | Stop reason: HOSPADM

## 2022-01-01 RX ORDER — SERTRALINE HYDROCHLORIDE 25 MG/1
25 TABLET, FILM COATED ORAL DAILY
Status: DISCONTINUED | OUTPATIENT
Start: 2022-01-01 | End: 2022-01-01

## 2022-01-01 RX ORDER — FUROSEMIDE 20 MG/1
20 TABLET ORAL DAILY
Qty: 60 TABLET | Refills: 3 | Status: SHIPPED | OUTPATIENT
Start: 2022-01-01

## 2022-01-01 RX ORDER — ASPIRIN 81 MG/1
81 TABLET ORAL DAILY
Status: DISCONTINUED | OUTPATIENT
Start: 2022-01-01 | End: 2022-01-01

## 2022-01-01 RX ORDER — ACETAMINOPHEN 650 MG/1
650 SUPPOSITORY RECTAL EVERY 4 HOURS PRN
Status: DISCONTINUED | OUTPATIENT
Start: 2022-01-01 | End: 2022-01-01 | Stop reason: SDUPTHER

## 2022-01-01 RX ORDER — LORAZEPAM 1 MG/1
1 TABLET ORAL ONCE
Qty: 1 TABLET | Refills: 0 | Status: SHIPPED | OUTPATIENT
Start: 2022-01-01 | End: 2022-01-01

## 2022-01-01 RX ORDER — LEVOTHYROXINE SODIUM 0.07 MG/1
75 TABLET ORAL DAILY
Status: DISCONTINUED | OUTPATIENT
Start: 2022-01-01 | End: 2022-01-01

## 2022-01-01 RX ORDER — MORPHINE SULFATE 2 MG/ML
1 INJECTION, SOLUTION INTRAMUSCULAR; INTRAVENOUS EVERY 4 HOURS PRN
Status: DISCONTINUED | OUTPATIENT
Start: 2022-01-01 | End: 2022-01-01

## 2022-01-01 RX ORDER — SODIUM CHLORIDE 9 MG/ML
5-250 INJECTION, SOLUTION INTRAVENOUS PRN
Status: CANCELLED | OUTPATIENT
Start: 2022-01-01

## 2022-01-01 RX ORDER — MEPERIDINE HYDROCHLORIDE 50 MG/ML
12.5 INJECTION INTRAMUSCULAR; INTRAVENOUS; SUBCUTANEOUS PRN
Status: CANCELLED | OUTPATIENT
Start: 2022-01-01

## 2022-01-01 RX ORDER — NITROGLYCERIN 0.4 MG/1
TABLET SUBLINGUAL
COMMUNITY
End: 2022-01-01

## 2022-01-01 RX ORDER — METOPROLOL SUCCINATE 25 MG/1
25 TABLET, EXTENDED RELEASE ORAL 2 TIMES DAILY
Status: DISCONTINUED | OUTPATIENT
Start: 2022-01-01 | End: 2022-01-01

## 2022-01-01 RX ORDER — SODIUM CHLORIDE 9 MG/ML
INJECTION, SOLUTION INTRAVENOUS PRN
Status: DISCONTINUED | OUTPATIENT
Start: 2022-01-01 | End: 2022-01-01 | Stop reason: HOSPADM

## 2022-01-01 RX ORDER — DIGOXIN 0.25 MG/ML
250 INJECTION INTRAMUSCULAR; INTRAVENOUS ONCE
Status: COMPLETED | OUTPATIENT
Start: 2022-01-01 | End: 2022-01-01

## 2022-01-01 RX ORDER — SODIUM CHLORIDE 0.9 % (FLUSH) 0.9 %
10 SYRINGE (ML) INJECTION PRN
Status: DISCONTINUED | OUTPATIENT
Start: 2022-01-01 | End: 2022-01-01

## 2022-01-01 RX ORDER — EPINEPHRINE 1 MG/ML
0.3 INJECTION, SOLUTION, CONCENTRATE INTRAVENOUS PRN
Status: CANCELLED | OUTPATIENT
Start: 2022-01-01

## 2022-01-01 RX ORDER — ENOXAPARIN SODIUM 100 MG/ML
40 INJECTION SUBCUTANEOUS DAILY
Status: DISCONTINUED | OUTPATIENT
Start: 2022-01-01 | End: 2022-01-01 | Stop reason: DRUGHIGH

## 2022-01-01 RX ADMIN — APIXABAN 5 MG: 5 TABLET, FILM COATED ORAL at 08:21

## 2022-01-01 RX ADMIN — SERTRALINE HYDROCHLORIDE 50 MG: 50 TABLET ORAL at 08:18

## 2022-01-01 RX ADMIN — ENOXAPARIN SODIUM 30 MG: 100 INJECTION SUBCUTANEOUS at 13:00

## 2022-01-01 RX ADMIN — Medication 10 ML: at 21:19

## 2022-01-01 RX ADMIN — LEVOTHYROXINE SODIUM 75 MCG: 0.05 TABLET ORAL at 06:22

## 2022-01-01 RX ADMIN — METOPROLOL SUCCINATE 25 MG: 25 TABLET, EXTENDED RELEASE ORAL at 10:28

## 2022-01-01 RX ADMIN — SODIUM CHLORIDE: 9 INJECTION, SOLUTION INTRAVENOUS at 08:28

## 2022-01-01 RX ADMIN — FUROSEMIDE 20 MG: 10 INJECTION, SOLUTION INTRAMUSCULAR; INTRAVENOUS at 04:08

## 2022-01-01 RX ADMIN — FUROSEMIDE 40 MG: 10 INJECTION, SOLUTION INTRAMUSCULAR; INTRAVENOUS at 13:21

## 2022-01-01 RX ADMIN — Medication 10 ML: at 21:20

## 2022-01-01 RX ADMIN — IOPAMIDOL 75 ML: 755 INJECTION, SOLUTION INTRAVENOUS at 04:57

## 2022-01-01 RX ADMIN — LEVOTHYROXINE SODIUM 75 MCG: 0.05 TABLET ORAL at 05:02

## 2022-01-01 RX ADMIN — ASPIRIN 81 MG: 81 TABLET, COATED ORAL at 09:42

## 2022-01-01 RX ADMIN — METOPROLOL SUCCINATE 25 MG: 25 TABLET, EXTENDED RELEASE ORAL at 09:42

## 2022-01-01 RX ADMIN — SENNOSIDES 8.6 MG: 8.6 TABLET, FILM COATED ORAL at 21:49

## 2022-01-01 RX ADMIN — SERTRALINE HYDROCHLORIDE 50 MG: 50 TABLET ORAL at 09:42

## 2022-01-01 RX ADMIN — METOPROLOL SUCCINATE 25 MG: 25 TABLET, EXTENDED RELEASE ORAL at 15:15

## 2022-01-01 RX ADMIN — LORAZEPAM 1 MG: 2 INJECTION INTRAMUSCULAR; INTRAVENOUS at 21:56

## 2022-01-01 RX ADMIN — METOPROLOL SUCCINATE 25 MG: 25 TABLET, EXTENDED RELEASE ORAL at 21:05

## 2022-01-01 RX ADMIN — MORPHINE SULFATE 1 MG/HR: 10 INJECTION INTRAVENOUS at 17:25

## 2022-01-01 RX ADMIN — Medication 10 ML: at 10:30

## 2022-01-01 RX ADMIN — APIXABAN 5 MG: 5 TABLET, FILM COATED ORAL at 09:42

## 2022-01-01 RX ADMIN — Medication 10 ML: at 21:15

## 2022-01-01 RX ADMIN — LEVOTHYROXINE SODIUM 75 MCG: 0.05 TABLET ORAL at 05:42

## 2022-01-01 RX ADMIN — Medication 10 ML: at 08:07

## 2022-01-01 RX ADMIN — APIXABAN 5 MG: 5 TABLET, FILM COATED ORAL at 21:15

## 2022-01-01 RX ADMIN — LORAZEPAM 1 MG: 2 INJECTION INTRAMUSCULAR; INTRAVENOUS at 13:29

## 2022-01-01 RX ADMIN — APIXABAN 5 MG: 5 TABLET, FILM COATED ORAL at 10:29

## 2022-01-01 RX ADMIN — Medication 10 ML: at 08:30

## 2022-01-01 RX ADMIN — APIXABAN 5 MG: 5 TABLET, FILM COATED ORAL at 21:49

## 2022-01-01 RX ADMIN — SERTRALINE HYDROCHLORIDE 50 MG: 50 TABLET ORAL at 08:21

## 2022-01-01 RX ADMIN — Medication 10 ML: at 22:53

## 2022-01-01 RX ADMIN — IOPAMIDOL 75 ML: 755 INJECTION, SOLUTION INTRAVENOUS at 22:46

## 2022-01-01 RX ADMIN — METOPROLOL SUCCINATE 25 MG: 25 TABLET, EXTENDED RELEASE ORAL at 08:18

## 2022-01-01 RX ADMIN — ONDANSETRON HYDROCHLORIDE 8 MG: 2 SOLUTION INTRAMUSCULAR; INTRAVENOUS at 11:26

## 2022-01-01 RX ADMIN — SENNOSIDES 8.6 MG: 8.6 TABLET, FILM COATED ORAL at 21:18

## 2022-01-01 RX ADMIN — APIXABAN 5 MG: 5 TABLET, FILM COATED ORAL at 20:13

## 2022-01-01 RX ADMIN — DIGOXIN 250 MCG: 0.25 INJECTION INTRAMUSCULAR; INTRAVENOUS at 17:26

## 2022-01-01 RX ADMIN — METOPROLOL SUCCINATE 25 MG: 25 TABLET, EXTENDED RELEASE ORAL at 08:30

## 2022-01-01 RX ADMIN — LEVOTHYROXINE SODIUM 75 MCG: 0.05 TABLET ORAL at 05:50

## 2022-01-01 RX ADMIN — FUROSEMIDE 40 MG: 10 INJECTION, SOLUTION INTRAMUSCULAR; INTRAVENOUS at 21:17

## 2022-01-01 RX ADMIN — ASPIRIN 81 MG: 81 TABLET, COATED ORAL at 08:30

## 2022-01-01 RX ADMIN — BUMETANIDE 1 MG: 0.25 INJECTION INTRAMUSCULAR; INTRAVENOUS at 10:10

## 2022-01-01 RX ADMIN — Medication 10 ML: at 12:17

## 2022-01-01 RX ADMIN — SERTRALINE HYDROCHLORIDE 50 MG: 50 TABLET ORAL at 12:59

## 2022-01-01 RX ADMIN — PRAVASTATIN SODIUM 40 MG: 20 TABLET ORAL at 08:21

## 2022-01-01 RX ADMIN — Medication 10 ML: at 21:56

## 2022-01-01 RX ADMIN — SENNOSIDES 8.6 MG: 8.6 TABLET, FILM COATED ORAL at 20:12

## 2022-01-01 RX ADMIN — SODIUM CHLORIDE: 9 INJECTION, SOLUTION INTRAVENOUS at 16:06

## 2022-01-01 RX ADMIN — METOPROLOL TARTRATE 5 MG: 5 INJECTION INTRAVENOUS at 00:28

## 2022-01-01 RX ADMIN — SODIUM CHLORIDE 200 MG: 9 INJECTION, SOLUTION INTRAVENOUS at 11:52

## 2022-01-01 RX ADMIN — SERTRALINE HYDROCHLORIDE 50 MG: 50 TABLET ORAL at 10:29

## 2022-01-01 RX ADMIN — APIXABAN 5 MG: 5 TABLET, FILM COATED ORAL at 08:18

## 2022-01-01 RX ADMIN — LEVOTHYROXINE SODIUM 75 MCG: 0.05 TABLET ORAL at 06:35

## 2022-01-01 RX ADMIN — METOPROLOL SUCCINATE 25 MG: 25 TABLET, EXTENDED RELEASE ORAL at 20:13

## 2022-01-01 RX ADMIN — LEVOTHYROXINE SODIUM 75 MCG: 0.05 TABLET ORAL at 06:39

## 2022-01-01 RX ADMIN — CEFTRIAXONE 1000 MG: 1 INJECTION, POWDER, FOR SOLUTION INTRAMUSCULAR; INTRAVENOUS at 10:10

## 2022-01-01 RX ADMIN — METOPROLOL SUCCINATE 25 MG: 25 TABLET, EXTENDED RELEASE ORAL at 08:21

## 2022-01-01 RX ADMIN — METOPROLOL SUCCINATE 25 MG: 25 TABLET, EXTENDED RELEASE ORAL at 21:20

## 2022-01-01 RX ADMIN — APIXABAN 5 MG: 5 TABLET, FILM COATED ORAL at 21:20

## 2022-01-01 RX ADMIN — SODIUM CHLORIDE: 9 INJECTION, SOLUTION INTRAVENOUS at 03:21

## 2022-01-01 RX ADMIN — ALBUMIN (HUMAN) 50 G: 0.25 INJECTION, SOLUTION INTRAVENOUS at 10:21

## 2022-01-01 RX ADMIN — METOPROLOL SUCCINATE 25 MG: 25 TABLET, EXTENDED RELEASE ORAL at 08:06

## 2022-01-01 RX ADMIN — TRAMADOL HYDROCHLORIDE 25 MG: 50 TABLET ORAL at 21:17

## 2022-01-01 RX ADMIN — BUMETANIDE 0.5 MG: 0.25 INJECTION, SOLUTION INTRAMUSCULAR; INTRAVENOUS at 10:54

## 2022-01-01 RX ADMIN — Medication 10 ML: at 20:12

## 2022-01-01 RX ADMIN — ASPIRIN 81 MG: 81 TABLET, COATED ORAL at 13:04

## 2022-01-01 RX ADMIN — ALBUMIN (HUMAN) 50 G: 0.25 INJECTION, SOLUTION INTRAVENOUS at 05:55

## 2022-01-01 RX ADMIN — SODIUM CHLORIDE: 9 INJECTION, SOLUTION INTRAVENOUS at 12:13

## 2022-01-01 RX ADMIN — ENOXAPARIN SODIUM 50 MG: 100 INJECTION SUBCUTANEOUS at 01:20

## 2022-01-01 RX ADMIN — METOPROLOL SUCCINATE 25 MG: 25 TABLET, EXTENDED RELEASE ORAL at 21:49

## 2022-01-01 RX ADMIN — APIXABAN 5 MG: 5 TABLET, FILM COATED ORAL at 08:07

## 2022-01-01 RX ADMIN — Medication 10 ML: at 12:15

## 2022-01-01 RX ADMIN — SERTRALINE HYDROCHLORIDE 50 MG: 50 TABLET ORAL at 08:30

## 2022-01-01 RX ADMIN — SERTRALINE HYDROCHLORIDE 50 MG: 50 TABLET ORAL at 08:06

## 2022-01-01 RX ADMIN — METOPROLOL SUCCINATE 25 MG: 25 TABLET, EXTENDED RELEASE ORAL at 21:18

## 2022-01-01 RX ADMIN — Medication 10 ML: at 08:18

## 2022-01-01 RX ADMIN — SODIUM CHLORIDE 20 ML/HR: 9 INJECTION, SOLUTION INTRAVENOUS at 11:26

## 2022-01-01 RX ADMIN — METOPROLOL SUCCINATE 25 MG: 25 TABLET, EXTENDED RELEASE ORAL at 21:14

## 2022-01-01 RX ADMIN — SODIUM CHLORIDE: 9 INJECTION, SOLUTION INTRAVENOUS at 09:41

## 2022-01-01 RX ADMIN — DIGOXIN 250 MCG: 0.25 INJECTION INTRAMUSCULAR; INTRAVENOUS at 15:15

## 2022-01-01 RX ADMIN — Medication 10 ML: at 09:43

## 2022-01-01 RX ADMIN — PRAVASTATIN SODIUM 40 MG: 20 TABLET ORAL at 12:59

## 2022-01-01 RX ADMIN — ASPIRIN 81 MG: 81 TABLET, COATED ORAL at 08:06

## 2022-01-01 RX ADMIN — TRAMADOL HYDROCHLORIDE 25 MG: 50 TABLET ORAL at 11:20

## 2022-01-01 RX ADMIN — GLYCOPYRROLATE 0.4 MG: 0.2 INJECTION, SOLUTION INTRAMUSCULAR; INTRAVENOUS at 18:42

## 2022-01-01 RX ADMIN — APIXABAN 5 MG: 5 TABLET, FILM COATED ORAL at 15:18

## 2022-01-01 RX ADMIN — ASPIRIN 81 MG: 81 TABLET, COATED ORAL at 10:28

## 2022-01-01 RX ADMIN — TRAMADOL HYDROCHLORIDE 25 MG: 50 TABLET ORAL at 21:48

## 2022-01-01 RX ADMIN — APIXABAN 5 MG: 5 TABLET, FILM COATED ORAL at 21:18

## 2022-01-01 RX ADMIN — LEVOTHYROXINE SODIUM 75 MCG: 0.05 TABLET ORAL at 06:26

## 2022-01-01 RX ADMIN — ASPIRIN 81 MG CHEWABLE TABLET 324 MG: 81 TABLET CHEWABLE at 00:22

## 2022-01-01 RX ADMIN — Medication 10 ML: at 21:49

## 2022-01-01 RX ADMIN — SENNOSIDES 8.6 MG: 8.6 TABLET, FILM COATED ORAL at 21:05

## 2022-01-01 RX ADMIN — SODIUM CHLORIDE, PRESERVATIVE FREE 10 ML: 5 INJECTION INTRAVENOUS at 00:28

## 2022-01-01 RX ADMIN — SENNOSIDES 8.6 MG: 8.6 TABLET, FILM COATED ORAL at 21:20

## 2022-01-01 RX ADMIN — MIDAZOLAM HYDROCHLORIDE 2 MG: 1 INJECTION, SOLUTION INTRAMUSCULAR; INTRAVENOUS at 04:46

## 2022-01-01 RX ADMIN — CEFTRIAXONE 1000 MG: 1 INJECTION, POWDER, FOR SOLUTION INTRAMUSCULAR; INTRAVENOUS at 10:53

## 2022-01-01 RX ADMIN — APIXABAN 5 MG: 5 TABLET, FILM COATED ORAL at 21:05

## 2022-01-01 RX ADMIN — TRAMADOL HYDROCHLORIDE 25 MG: 50 TABLET ORAL at 17:30

## 2022-01-01 RX ADMIN — Medication 10 ML: at 08:29

## 2022-01-01 RX ADMIN — APIXABAN 5 MG: 5 TABLET, FILM COATED ORAL at 08:30

## 2022-01-01 RX ADMIN — SODIUM CHLORIDE 1000 ML: 9 INJECTION, SOLUTION INTRAVENOUS at 03:23

## 2022-01-01 RX ADMIN — ASPIRIN 81 MG: 81 TABLET, COATED ORAL at 08:21

## 2022-01-01 RX ADMIN — ASPIRIN 81 MG: 81 TABLET, COATED ORAL at 08:18

## 2022-01-01 ASSESSMENT — PAIN SCALES - GENERAL
PAINLEVEL_OUTOF10: 6
PAINLEVEL_OUTOF10: 0
PAINLEVEL_OUTOF10: 6
PAINLEVEL_OUTOF10: 0
PAINLEVEL_OUTOF10: 6
PAINLEVEL_OUTOF10: 7
PAINLEVEL_OUTOF10: 0
PAINLEVEL_OUTOF10: 6
PAINLEVEL_OUTOF10: 0

## 2022-01-01 ASSESSMENT — PAIN DESCRIPTION - DESCRIPTORS
DESCRIPTORS: SORE
DESCRIPTORS: ACHING

## 2022-01-01 ASSESSMENT — PAIN DESCRIPTION - PAIN TYPE
TYPE: ACUTE PAIN
TYPE: CHRONIC PAIN

## 2022-01-01 ASSESSMENT — ENCOUNTER SYMPTOMS
COUGH: 0
STRIDOR: 0
SHORTNESS OF BREATH: 1
BACK PAIN: 0
COLOR CHANGE: 0
VOMITING: 0
CONSTIPATION: 0
WHEEZING: 0
ABDOMINAL DISTENTION: 0
ABDOMINAL PAIN: 0
DIARRHEA: 0
NAUSEA: 0

## 2022-01-01 ASSESSMENT — PAIN DESCRIPTION - LOCATION
LOCATION: BACK
LOCATION: SHOULDER
LOCATION: BACK
LOCATION: SHOULDER

## 2022-01-01 ASSESSMENT — PAIN DESCRIPTION - ORIENTATION
ORIENTATION: LEFT
ORIENTATION: LEFT
ORIENTATION: LOWER

## 2022-01-01 ASSESSMENT — PAIN DESCRIPTION - FREQUENCY: FREQUENCY: INTERMITTENT

## 2022-08-02 NOTE — TELEPHONE ENCOUNTER
Dr. Cinthya Mcgee requesting [facility]. Order completed via online portal. Copy of patient's insurance card, face sheet and path report along with test requisition form  was submitted via online portal to [facility]. Email confirmation received of order. [facility] is to obtain specimen. All forms given to Dr. Radha Diaz office staff to be scanned into patient's EMR.

## 2022-08-02 NOTE — PROGRESS NOTES
[facility]  1935 82 y.o. Referring Physician: Dr. Josh Adair    PCP: Christina Doe, DO    Vitals:    08/02/22 0933   BP: (!) 177/83   Pulse: 61   Temp: 97 °F (36.1 °C)   SpO2: 93%        Wt Readings from Last 3 Encounters:   08/02/22 193 lb 14.4 oz (88 kg)   11/25/19 185 lb (83.9 kg)   08/27/19 178 lb (80.7 kg)        Body mass index is 28.63 kg/m². Chief Complaint:   Chief Complaint   Patient presents with    New Patient     Nodule of lung, mass of neck, mdiastinal lymphadenopathy         Cancer Staging  No matching staging information was found for the patient. Prior Radiation Therapy? NO    Concurrent Chemo/radiation? NO    Prior Chemotherapy? NO    Prior Hormonal Therapy? NO    Head and Neck Cancer? No, patient does NOT have HN cancer. Current Outpatient Medications:     metoprolol tartrate (LOPRESSOR) 25 MG tablet, TAKE 1/2 TABLET BY MOUTH EVERY DAY, Disp: , Rfl:     nitroGLYCERIN (NITROSTAT) 0.4 MG SL tablet, PLEASE SEE ATTACHED FOR DETAILED DIRECTIONS, Disp: , Rfl:     midodrine (PROAMATINE) 2.5 MG tablet, Take 2.5 mg by mouth 3 times daily, Disp: , Rfl:     Multiple Vitamins-Minerals (THERAPEUTIC MULTIVITAMIN-MINERALS) tablet, Take 1 tablet by mouth daily, Disp: , Rfl:     sertraline (ZOLOFT) 25 MG tablet, Take 25 mg by mouth daily, Disp: , Rfl:     pravastatin (PRAVACHOL) 40 MG tablet, Take 40 mg by mouth daily, Disp: , Rfl:     levothyroxine (SYNTHROID) 75 MCG tablet, Take 75 mcg by mouth daily, Disp: , Rfl:     aspirin 81 MG tablet, Take 81 mg by mouth daily, Disp: , Rfl:     Cholecalciferol (VITAMIN D3) 85757 units CAPS, Take by mouth every 14 days, Disp: , Rfl:        Past Medical History:   Diagnosis Date    Hyperlipidemia     Hypertension     Hypothyroidism        Past Surgical History:   Procedure Laterality Date    CHOLECYSTECTOMY      CORONARY ANGIOPLASTY WITH STENT PLACEMENT  2008    TONSILLECTOMY         History reviewed. No pertinent family history.     Social History     Socioeconomic History    Marital status:      Spouse name: Not on file    Number of children: Not on file    Years of education: Not on file    Highest education level: Not on file   Occupational History    Not on file   Tobacco Use    Smoking status: Former    Smokeless tobacco: Never   Substance and Sexual Activity    Alcohol use: Never    Drug use: Never    Sexual activity: Not on file   Other Topics Concern    Not on file   Social History Narrative    Not on file     Social Determinants of Health     Financial Resource Strain: Not on file   Food Insecurity: Not on file   Transportation Needs: Not on file   Physical Activity: Not on file   Stress: Not on file   Social Connections: Not on file   Intimate Partner Violence: Not on file   Housing Stability: Not on file           Occupation: [employer]  Retired:  YES: Patient is retired from [employer]. REVIEW OF SYSTEMS: <<For Level 5, 10 or more systems>>     Pacemaker/Defibulator/ICD:  No    Mediport: No           FALLS RISK SCREENING ASSESSMENT    Instructions:  Assess the patient and [place] the appropriate indicators that are present for fall risk identification. Total the numbers circled and assign a fall risk score from Table 2.  Reassess patient at a minimum every 12 weeks or with status change. Assessment   Date  8/2/2022     1. Mental Ability: confusion/cognitively impaired No - 0       2. Elimination Issues: incontinence, frequency Yes - 3       3. Ambulatory: use of assistive devices (walker, cane, off-loading devices), attached to equipment (IV pole, oxygen) Yes - 2     4. Sensory Limitations: dizziness, vertigo, impaired vision No - 0       5. Age 72 years or greater - 1       10. Medication: diuretics, strong analgesics, hypnotics, sedatives, antihypertensive agents   Yes - 3   7.   Falls:  recent history of falls within the last 3 months (not to include slipping or tripping)   No - 0   TOTAL 9    If score of 4 or greater was education given? Yes       TABLE 2   Risk Score Risk Level Plan of Care   0-3 Little or  No Risk 1. Provide assistance as indicated for ambulation activities  2. Reorient confused/cognitively impaired patient  3. Call-light/bell within patient's reach  4. Chair/bed in low position, stretcher/bed with siderails up except when performing patient care activities  5. Educate patient/family/caregiver on falls prevention  6.  Reassess in 12 weeks or with any noted change in patient condition which places them at a risk for a fall   4-6 Moderate Risk 1. Provide assistance as indicated for ambulation activities  2. Reorient confused/cognitively impaired patient  3. Call-light/bell within patient's reach  4. Chair/bed in low position, stretcher/bed with siderails up except when performing patient care activities  5. Educate patient/family/caregiver on falls prevention  6. Falls risk precaution (Yellow sticker Level II) placed on patient chart   7 or   Higher High Risk 1. Place patient in easily observable treatment room  2. Patient attended at all times by family member or staff  3. Provide assistance as indicated for ambulation activities  4. Reorient confused/cognitively impaired patient  5. Call-light/bell within patient's reach  6. Chair/bed in low position, stretcher/bed with siderails up except when performing patient care activities  7. Educate patient/family/caregiver on falls prevention  8. Falls risk precaution (Yellow sticker Level III) placed on patient chart           MALNUTRITION RISK SCREENING ASSESSMENT    Instructions:  Assess the patient and enter the appropriate indicators that are present for nutrition risk identification. Total the numbers entered and assign a risk score. Follow the appropriate action for total score listed below. Assessment   Date  8/2/2022     Have you lost weight without trying?       0- No     Have you been eating poorly because of a decreased appetite? 0- No   3. Do you have a diagnosis of head and neck cancer? 0- No                                                                                    TOTAL 0        Score of 0-1: No action  Score 2 or greater: For Non-Diabetic Patient: Recommend adding Ensure Enlive 2 x daily and provide patient with Ensure wellness bag with coupons  For Diabetic Patient: Recommend adding Glucerna Shake 2 x daily and provide patient with Glucerna Wellness bag with coupons  Route to the dietitian via [address]    Are you having  difficulty performing daily routine tasks  due to fatigue or weakness (ie: bathing/showering, dressing, housework, meal prep, work, child Donivan Perea): No     Do you have any arm flexibility/ROM restrictions, swelling or pain that limit activity: No     Any changes in memory, attention/focus that impact daily activities: No     Do you avoid participation in leisure/social activity due weakness, fatigue or pain: No     ARE ANY OF THE ABOVE ARE ANSWERED YES: No          PT ASSESSMENT FOR REFERRAL    Have you had any recent falls in past 2 months: No     Do you have difficulty  going up/down stairs: No     Are you having difficulty walking: No     Do you often hold onto furniture/environmental supports or feel off balance when you are walking: Yes     Do you need to take rest breaks when you are walking: No     Any pain on scale of 1-10 that limits your mobility: No 0/10    ARE ANY OF THE ABOVE ARE ANSWERED YES: Yes - but NO PT referral request sent due to patient will request at later date if he wants this. PREHAB AUDIOLOGY REFERRAL    - Is patient planned to receive Cisplatin? No. This patient is not planned to start Cisplatin. - Is patient complaining of new onset hearing loss? No. Patient is not complaining of new onset hearing loss.         Elli Mariee, RN

## 2022-08-02 NOTE — TELEPHONE ENCOUNTER
Met with pt and pt's son in conjunction with medical oncologist consultation. Pt is 44-year-old male being evaluated for lung nodules. Pt's mood appeared euthymic with full affect, he appeared A&Ox4, and he was willing/able to participate in session. He appeared appropriately dressed/groomed and was able to ambulate/transfer with the use of a cane. Pt and pt's son discussed stress surrounding testing for lung nodules. Stated that pt will undergo additional testing to determine diagnosis. Provided education on role of oncology social work and encouraged pt/pt's son to notify this provider if needs arise.     Simon Irene, MSW, LISW-S  Oncology Social Worker

## 2022-08-02 NOTE — PROGRESS NOTES
[facility] MED ONCOLOG[facility]  [address] [place] [address] 29. [phone]  Dept: [phone]  Loc: [phone]  Attending Consult Note      Reason for Visit:   Newly diagnosed non-small cell lung cancer and CLL/SLL. Referring Physician: Dr. Luis Miguel Busby    PCP:  Oumar 19, DO    History of Present Illness:     Mr. Racheal Vallejo is a pleasant 81-year-old gentleman, with a past medical history significant for hypertension, hyperlipidemia and hypothyroidism, who was found to have a right neck mass when he had a work-up done for right hand numbness, he had on 6/28/2022 and neck CT scan done, revealing enlarged necrotic lymph nodes on the right side of the neck, possible mediastinal and right hilar adenopathy, he had a chest CT scan done on 7/8/2022, revealing right lower lobe nodule, measuring up to 2.5 cm, highly suspicious for primary neoplasm, additional subcentimeter nodule in the left lower lobe, nonspecific, metastatic disease is not excluded, bulky mediastinal and right hilar adenopathy, compatible with steven metastatic disease, compression of the right middle lobe bronchus by right hilar adenopathy with associated volume loss of the right middle lobe, the patient had onset 11/20/2020 right neck mass biopsy, cytology was positive for malignant cells, poorly differentiated adenocarcinoma, the flow cytometry had revealed monotypic CD5 positive, CD23 positive B-cell population, with 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 Review of Systems;  CONSTITUTIONAL: No fever, chills. Fair appetite and energy level. ENMT: Eyes: No diplopia; Nose: No epistaxis. Mouth: No sore throat. RESPIRATORY: No hemoptysis, no significant shortness of breath, cough. CARDIOVASCULAR: No chest pain, palpitations. GASTROINTESTINAL: No nausea/vomiting, abdominal pain, diarrhea/constipation.   GENITOURINARY: No dysuria, urinary frequency, cyanosis, or edema. NEUROLOGIC: No focal deficits. ECOG PS 1      Impression/Plan:     Mr. Bernadette Rock is a pleasant 63-year-old gentleman, with a past medical history significant for hypertension, hyperlipidemia and hypothyroidism, who was found to have a right neck mass when he had a work-up done for right hand numbness, he had on 6/28/2022 and neck CT scan done, revealing enlarged necrotic lymph nodes on the right side of the neck, possible mediastinal and right hilar adenopathy, he had a chest CT scan done on 7/8/2022, revealing right lower lobe nodule, measuring up to 2.5 cm, highly suspicious for primary neoplasm, additional subcentimeter nodule in the left lower lobe, nonspecific, metastatic disease is not excluded, bulky mediastinal and right hilar adenopathy, compatible with steven metastatic disease, compression of the right middle lobe bronchus by right hilar adenopathy with associated volume loss of the right middle lobe, the patient had onset 11/20/2020 right neck mass biopsy, cytology was positive for malignant cells, poorly differentiated adenocarcinoma, the flow cytometry had revealed monotypic CD5 positive, CD23 positive B-cell population, with 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 The patient has a newly diagnosed non small cell lung cancer, adenocarcinoma with bulky mediastinal and right hilar adenopathy, metastatic disease to the right upper neck, he has subcentimeter nodules in the left lower lobe, discussed with the patient and his son diagnosis and prognosis, recommended to complete the staging work-up, the patient will have a PET scan done as well as brain MRI. PD-L1 and NGS testing were ordered. The patient has CLL/SLL, we discussed that this will not require treatment at this time. CLL FISH and IGHV testing were ordered. We discussed referral to palliative medicine, they would like to hold off on the referral for now.     RTC in 2 weeks.    Thank you for allowing us to participate in the care of Mr. Mikal Sidhu.     Keke Kearns MD   HEMATOLOGY/MEDICAL [address] MED ONCOLOGY  [facility] [address] [address]  Dept: [address]: [phone]

## 2022-08-02 NOTE — TELEPHONE ENCOUNTER
Met with patient and his son, [facility], during his initial consultation with Dr. Starr Roman  for his  recent lung cancer and lymphoma diagnosis. Introduced myself and explained my role with patients receiving treatment at our center. Patient was friendly and receptive. Instructed on next steps including PET scan, at [facility] imaging per patient, MRI of brain, at  [facility] MRI per patient, PDL1/ molecular studies and lab work per Dr. Koko Muse recommendations and follow up care. Provided patient with transportation resource list and literature on [employer], Lung Cancer definitions hand out, Lung cancer nutrition handout and Patient Resource Lung Cancer booklet. Reviewed resources available to him  such as Social Work and [employer]. Patient denies any needs at this time. New patient nursing assessment, medical history, surgical history, family history completed. Medication list reviewed and updated. Provided with my contact information and instructed patient to call me with questions or concerns. Verbalizes understanding. Patient appreciative of visit. Will continue to follow.

## 2022-08-04 NOTE — PROGRESS NOTES
Spoke with patient. He is concerned that he may have anxiety with PET scan. Ativan rx sent for one time dose prior to PET scan. Patients son will drive him to testing on 8/13/22.

## 2022-08-05 NOTE — TELEPHONE ENCOUNTER
Called patient in regards to getting MRI scheduled with [facility] to inquire if he needed any assistance making appointment. Patient informed me that he has an MRI appointment 8/10 that his son helped him make. He also has PET scan scheduled for 8/13. Patient denies any needs at this time, encourage patient to call if anything changed.

## 2022-08-09 NOTE — TELEPHONE ENCOUNTER
Received message from Beau Addison at [address] that they needed a liquid biopsy to continue to run molecular tests and that the PDL1 was still in process. [facility] will be calling patient in the next 1-2 days  to set up a time to draw his blood. Patient was called to inform him of this. Explanation was given that he would be getting a call in the next couple days to set up blood draw at his home from [address] and patient agreeable to this.

## 2022-08-16 PROBLEM — C34.91 PRIMARY ADENOCARCINOMA OF RIGHT LUNG (HCC): Status: ACTIVE | Noted: 2022-01-01

## 2022-08-16 NOTE — PROGRESS NOTES
[facility]  [facility] 29. [id number]  Dept: [phone]  Loc: [phone]  Attending Consult Note      Reason for Visit:   Newly diagnosed non-small cell lung cancer and CLL/SLL. Referring Physician: Dr. Jg Cortez    PCP:  Cinthya Clay DO    History of Present Illness:     Mr. Tea Goins is a pleasant 79-year-old gentleman, with a past medical history significant for hypertension, hyperlipidemia and hypothyroidism, who was found to have a right neck mass when he had a work-up done for right hand numbness, he had on 6/28/2022 and neck CT scan done, revealing enlarged necrotic lymph nodes on the right side of the neck, possible mediastinal and right hilar adenopathy, he had a chest CT scan done on 7/8/2022, revealing right lower lobe nodule, measuring up to 2.5 cm, highly suspicious for primary neoplasm, additional subcentimeter nodule in the left lower lobe, nonspecific, metastatic disease is not excluded, bulky mediastinal and right hilar adenopathy, compatible with steven metastatic disease, compression of the right middle lobe bronchus by right hilar adenopathy with associated volume loss of the right middle lobe, the patient had onset 11/20/2020 right neck mass biopsy, cytology was positive for malignant cells, poorly differentiated adenocarcinoma, the flow cytometry had revealed monotypic CD5 positive, CD23 positive B-cell population, with 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 Since the last visit the patient had a PET scan and brain MRI done. Review of Systems;  CONSTITUTIONAL: No fever, chills. Fair appetite and energy level. ENMT: Eyes: No diplopia; Nose: No epistaxis. Mouth: No sore throat. RESPIRATORY: No hemoptysis, no significant shortness of breath, cough. CARDIOVASCULAR: No chest pain, palpitations.   GASTROINTESTINAL: No nausea/vomiting, abdominal pain, diarrhea/constipation. GENITOURINARY: No dysuria, urinary frequency, hematuria. MSK: Positive for pain in the low back on the right radiating to the leg. NEURO: No syncope, presyncope, headache. Remainder:  ROS NEGATIVE    Past Medical History:      Diagnosis Date    Cancer ([facility] [address] 2022    lung, lymphoma    Hyperlipidemia     Hypertension     Hypothyroidism      Patient Active Problem List   Diagnosis    Essential hypertension    Acquired hypothyroidism    Orthostatic hypotension        Past Surgical History:      Procedure Laterality Date    CHOLECYSTECTOMY      CORONARY ANGIOPLASTY WITH STENT PLACEMENT  2008    TONSILLECTOMY         Family History:  Family History   Problem Relation Age of Onset    Stroke Father        Medications:  Reviewed and reconciled. Social History:  Social History     Socioeconomic History    Marital status:       Spouse name: Not on file    Number of children: Not on file    Years of education: Not on file    Highest education level: Not on file   Occupational History    Not on file   Tobacco Use    Smoking status: Former    Smokeless tobacco: Never    Tobacco comments:     Pt states he quit 40-50 years ago   Vaping Use    Vaping Use: Never used   Substance and Sexual Activity    Alcohol use: Never    Drug use: Never    Sexual activity: Not on file   Other Topics Concern    Not on file   Social History Narrative    Not on file     Social Determinants of Health     Financial Resource Strain: Not on file   Food Insecurity: Not on file   Transportation Needs: Not on file   Physical Activity: Not on file   Stress: Not on file   Social Connections: Not on file   Intimate Partner Violence: Not on file   Housing Stability: Not on file       Allergies:  No Known Allergies    Physical Exam:  BP (!) 173/76   Pulse 65   Temp 97.1 °F (36.2 °C) (Infrared)   Resp 16   Ht 5' 9\" (1.753 m)   Wt 190 lb (86.2 kg)   SpO2 94%   BMI 28.06 kg/m²   GENERAL: Alert, oriented x 3, not in acute distress. HEENT: PERRLA; EOMI. Oropharynx clear. NECK: Supple. Palpable lymph node in the right upper neck. LUNGS: Good air entry bilaterally. No wheezing, crackles or rhonchi. CARDIOVASCULAR: Regular rate. No murmurs, rubs or gallops. ABDOMEN: Soft. Non-tender, non-distended. Positive bowel sounds. EXTREMITIES: Without clubbing, cyanosis, or edema. NEUROLOGIC: No focal deficits. ECOG PS 1      Impression/Plan:     Mr. Alberta Kahn is a pleasant 28-year-old gentleman, with a past medical history significant for hypertension, hyperlipidemia and hypothyroidism, who was found to have a right neck mass when he had a work-up done for right hand numbness, he had on 6/28/2022 and neck CT scan done, revealing enlarged necrotic lymph nodes on the right side of the neck, possible mediastinal and right hilar adenopathy, he had a chest CT scan done on 7/8/2022, revealing right lower lobe nodule, measuring up to 2.5 cm, highly suspicious for primary neoplasm, additional subcentimeter nodule in the left lower lobe, nonspecific, metastatic disease is not excluded, bulky mediastinal and right hilar adenopathy, compatible with steven metastatic disease, compression of the right middle lobe bronchus by right hilar adenopathy with associated volume loss of the right middle lobe, the patient had onset 11/20/2020 right neck mass biopsy, cytology was positive for malignant cells, poorly differentiated adenocarcinoma, the flow cytometry had revealed monotypic CD5 positive, CD23 positive B-cell population, with 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 The patient has a newly diagnosed non small cell lung cancer, adenocarcinoma with bulky mediastinal and right hilar adenopathy, metastatic disease to the right upper neck, he has subcentimeter nodules in the left lower lobe, discussed with the patient and his son diagnosis and prognosis, recommended to complete the staging work-up.     PET scan was done on 8/13/2022, revealing FDG avid right cervical and supraclavicular adenopathy, soft tissue mass in the right lung, additional smaller nodules consistent with metastasis, mediastinal and right hilar lymphadenopathy, lymphadenopathy, right adrenal and liver hypermetabolic metastasis, brain MRI is negative for metastatic disease. The patient has non small cell lung cancer, metastatic to the lymph node in the right neck, liver and adrenal gland, the uptake in the lymph nodes in the bones could be secondary to CLL/SLL or lung ca. MRI of the lumbar spine and pelvis were ordered. I discussed with the patient diagnosis, prognosis, and recommendations for treatment, PD-L1 expression is 90%, he is a candidate for single agent Keytruda, there was not enough tissue to send for NGS testing, he will have foundation 1 liquid biopsy done today. Start treatment as authorized, we discussed that RT can be done for palliation. Referral was placed to palliative medicine for symptoms management. RTC in 1 week. Thank you for allowing us to participate in the care of Mr. Tea Goins.     Lizbeth Adrian MD   HEMATOLOGY/MEDICAL [address] [facility]  [address] MED ONCOLOGY  [address] [place] [phone]  Dept: [facility]: [phone]

## 2022-08-16 NOTE — TELEPHONE ENCOUNTER
Spoke with Burt Erwin at [address] regarding whether their mobile phlebotomy had obtained a liquid biopsy specimen on patient. She stated that there was appointment scheduled for 8/12/22 but that there was no specimen received on patient yet. Specimen obtained on patient from office visit today and sent, with signed ABN and original order, via [employer] today.

## 2022-08-18 NOTE — PROGRESS NOTES
Son called in stating patient is having pain in the left axilla/rib area. Patient does have lymphadenopathy as well as left rib mets. MRI lumbar spine/pelvis ordered to further evaluate the lesions of right iliac crest and his complaints of pain. No further imaging necessary at this time. Referral to palliative care made. Ultram given in interim. Son asks that all appt requests be made through him. Basim Smart [phone].

## 2022-08-18 NOTE — TELEPHONE ENCOUNTER
Patient's son, Dolly Hope, called stating that his father is experiencing pain in his left arm pit that started 2 days ago. It was mild at start but is now unbearable, achy but turns sharp with movement and they were asking whether a patient needs a test to check this. Axel Valle NP was updated on this and called Yandel back with plan.

## 2022-08-23 NOTE — PROGRESS NOTES
Palliative Care Department  Provider: GREG Beard - CNP    Referring Provider: Dr. Billy Green      Location of Service:   [address] [place] [address]    Reason for Consult:  []  Code status Discussion  []  Assist with goals of care  []  Psychosocial support  [x]  Symptom Management  []  Advanced Care Planning    Chief Complaint: Vijaya Gomez is a 82 y.o. male with chief complaint of fatigue, pain    Assessment/Plan      Non-small cell lung cancer and CLL/SLL:   -Known to Dr. Billy Green   -Diagnosed July 2022   -On Keytruda    Pain:   -Tramadol 50 mg every 6 as needed   -Patient reports pain is improving    Fatigue/physical debility:   -Encourage activity as tolerated   -Discussed the option of home health care with PT OT he refused   -Continue to monitor    Follow Up:   3 weeks . They were encouraged to call with any questions, concerns, needs, or changes in symptoms. Subjective:     HPI:  Vijaya Gomez is a 82 y.o. male with significant medical history of non-small cell lung cancer and CLL/SLL, diagnosed July 2022, known to Dr. Billy Green, who was referred to [address] [place] [address] symptomatic management. Subjective/Events/Discussions:  Juan Akhtar is seen in the infusion center today accompanied by her son. He is alert, oriented to person, appears to have some mild situational confusion. Son states that this has become a bit more frequent recently. He does admit to some pain in his low back, which is intermittent, overall he states is getting better. He had been prescribed tramadol for medical oncology, this is working well for his pain, he does not really refill at this point in time. His biggest complaint is that of fatigue and weakness, reporting that he has had more difficulty getting out of his chair at home, and reports that this is led to some incidences of incontinence due to his weakness and inability to get to the bathroom quick enough.   I did discuss with him the option of home health care to provide support, assistance, as well as physical therapy and Occupational Therapy, however he declines this at this point time. I encouraged him to increase his activity as he is able, and if this continues to worsen please reach out we will assist with arranging home health care. He otherwise have no other significant complaints, no changes are needed at this point time. I encouraged the son to reach out if there are any changes in symptoms, or any other concerns the palliative service may be able to provide assistance with. Past Medical History:   Diagnosis Date    Cancer ([facility] [address] 2022    lung, lymphoma    Hyperlipidemia     Hypertension     Hypothyroidism        Past Surgical History:   Procedure Laterality Date    CHOLECYSTECTOMY      CORONARY ANGIOPLASTY WITH STENT PLACEMENT  2008    TONSILLECTOMY         Family History   Problem Relation Age of Onset    Stroke Father        ROS: UNLESS STATED ABOVE PATIENT DENIES:  CONSTITUTIONAL:  fever, chill, rigors, nausea, vomiting, fatigue. HEENT: blurry vision, double vision, hearing problem, tinnitus, hoarseness, dysphagia, odynophagia  RESPIRATORY: cough, shortness of breath, sputum expectoration. CARDIOVASCULAR:  Chest pain/pressure, palpitation, syncope, irregular beats  GASTROINTESTINAL:  abdominal or rectal pain, diarrhea, constipation, . GENITOURINARY:  Burning, frequency, urgency, incontinence, discharge  INTEGUMENTARY: rash, wound, pruritis  HEMATOLOGIC/LYMPHATIC:  Swelling, sores, gum bleeding, easy bruising, pica. MUSCULOSKELETAL:  pain, edema, joint swelling or redness  NEUROLOGICAL:  light headed, dizziness, loss of consciousness, weakness, change in memory, seizures, tremors    Objective:     Physical Exam  There were no vitals taken for this visit.     Gen:  Alert, elderly, well nourished, in no acute distress  HEENT:  Normocephalic, conjunctiva pink, no drainage, mucosa moist  Neck:  Supple  Lungs:  CTA bilaterally, no audible rhonchi or wheezes noted  Heart[de-identified]  RRR, no murmur, rub, or gallop noted during exam  Abd:  Soft, non tender, non distended, BS+  M/S/Ext:  Moving all extremities, no edema, pulses present  Skin:  Warm and dry  Neuro:  PERRL, Alert, oriented x 3, some mild confusion noted, following commands    [place] Symptom Assessment Score   [place] Score 8/23/2022   Pain Score 1   Tiredness Score 7   Nausea Score Not nauseated   Depression Score Not depressed   Anxiety Score Not anxious   Drowsiness Score Not drowsy   Appetite Score 2   Wellbeing Score Best feeling of wellbeing   Dyspnea Score 5   Other Problem Score Best possible response   Total Assessment Score(calculated) 15     Assessed by: patient and provider. Current Medications:  Medications reviewed: yes    Controlled Substances Monitoring: [place]ARRS reviewed 8/23/22. GREG Stoner CNP  Palliative Medicine    Time/Communication  Greater than 50% of time spent, total 35 minutes in face-to-face counseling and coordination of care regarding symptom management and see above. Discussed the plan of care with the other IDT members of the Palliative Care Team, and with consultants, Primary Attending, patient, family, and facility staff. Thank you for allowing Palliative Medicine to participate in the care of Gearl Heart. Note: This report was completed using computerize voiced recognition software. Every effort has been made to ensure accuracy; however, inadvertent computerized transcription errors may be present.

## 2022-08-23 NOTE — LETTER
DX codes: R79.89, J96.21      PATIENT                 Name: Latosha Maciel : 1935 (82 yrs)   Address: [address] [place] [address] Sex: Male   City: [place] [address]         Marital Status:    Employer: RETIRED         Uatsdin: None   Primary Care Provider: Thais Ross. *         Primary Phone: [address] Name Legal Guardian? Relationship to Patient Home Phone Work Phone   1. Liam Mora  2. Anurag Olesn    No Child  Child [phone]                 GUARANTOR            Guarantor: Latosha Maciel     : 1935   Address: [address] [place] [address] Sex: Male     [place] [address]     Relation to Patient: Self       Home Phone: [phone]   Guarantor ID: [id number]       Work Phone:     Guarantor Employer: RETIRED         Status: RETIRED      COVERAGE        PRIMARY INSURANCE   Payor: MEDICARE Plan: MEDICARE PART A AND B   Payor Address: [facility] [id number], [place] [address]       Group Number:   Insurance Type: INDEMNITY   Subscriber Name: Darlene Lea : 1935   Subscriber ID: [id number] Pat. Rel. to Sub: Self   SECONDARY INSURANCE   Payor: GENERIC COMMERCIAL Plan: GENERIC COMMERCIAL   Payor Address:  ,           Group Number: [id number] Insurance Type: [id number] Name: Darlene Lea : 1935   Subscriber ID: [id number] Gertrudis Armenta.  Rel. to Sub: SELF         CSN: [id number]

## 2022-08-23 NOTE — TELEPHONE ENCOUNTER
Received message earlier today from patient's son, Anup Coronado, with concerns about patient having what he described as severe cognitive decline. He states that over the last 4-5 days patient has been very confused, not even remembering how to work the TV remote, having difficulty walking and incontinence and he was not able to come with the patient to his appointment today and didn't know if Dr. Amado Moss was aware. Anup Coronado stated that the only new medication was the ultram patient took for pain but that it had been helping the patient's pain a lot. Spoke with Dr. Amado Moss regarding the son's concerns and she was only aware of the incontinence. She asked for palliative care to be updated on confusion. Spoke with Viona Hodgkin in palliative care and she stated that patient already had an appointment today but that the family could try cutting the ultram in half to see if this helped at all. Viona Hodgkin questioned whether patient might have a UTI. Anup Coronado was called back and informed about trying half a ultram and questioned if his father was having any other UTI symptoms. He stated that he would be going over after work to look into this more and would call if they felt a urine test was warranted.

## 2022-08-23 NOTE — PROGRESS NOTES
[facility] [address] MED ONCOLOGY  [address] [place] [address] 29. [id number]  Dept: [phone]  Loc: [phone]  Attending Consult Note      Reason for Visit:   Newly diagnosed non-small cell lung cancer and CLL/SLL. Referring Physician: Dr. Tho Henderson    PCP:  Oumar Ramirez, DO    History of Present Illness:     Mr. Kash Reyes is a pleasant 51-year-old gentleman, with a past medical history significant for hypertension, hyperlipidemia and hypothyroidism, who was found to have a right neck mass when he had a work-up done for right hand numbness, he had on 6/28/2022 and neck CT scan done, revealing enlarged necrotic lymph nodes on the right side of the neck, possible mediastinal and right hilar adenopathy, he had a chest CT scan done on 7/8/2022, revealing right lower lobe nodule, measuring up to 2.5 cm, highly suspicious for primary neoplasm, additional subcentimeter nodule in the left lower lobe, nonspecific, metastatic disease is not excluded, bulky mediastinal and right hilar adenopathy, compatible with steven metastatic disease, compression of the right middle lobe bronchus by right hilar adenopathy with associated volume loss of the right middle lobe, the patient had onset 11/20/2020 right neck mass biopsy, cytology was positive for malignant cells, poorly differentiated adenocarcinoma, the flow cytometry had revealed monotypic CD5 positive, CD23 positive B-cell population, with 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     The patient had a brain MRI done, was negative for metastatic disease, PET scan was done on 8/13/2022, revealing FDG avid right cervical and supraclavicular adenopathy, soft tissue mass in the right lung, additional smaller nodules consistent with metastasis, mediastinal and right hilar lymphadenopathy, lymphadenopathy, right adrenal and liver hypermetabolic metastasis, uptake in the lymph nodes in the bones could be secondary to CLL/SLL or lung ca. PD-L1 expression is 90%, today 8/23/2022, the patient be started on systemic therapy with Keytruda. Back pain is well controlled with the tramadol. Review of Systems;  CONSTITUTIONAL: No fever, chills. Fair appetite and energy level. ENMT: Eyes: No diplopia; Nose: No epistaxis. Mouth: No sore throat. RESPIRATORY: No hemoptysis, no significant shortness of breath, cough. CARDIOVASCULAR: No chest pain, palpitations. GASTROINTESTINAL: No nausea/vomiting, abdominal pain, diarrhea/constipation. GENITOURINARY: Positive for urinary incontinence. He is not able to empty his bladder completely  MSK: Positive for pain in the low back on the right radiating to the leg. NEURO: No syncope, presyncope, headache. Remainder:  ROS NEGATIVE    Past Medical History:      Diagnosis Date    Cancer ([facility] [address] 2022    lung, lymphoma    Hyperlipidemia     Hypertension     Hypothyroidism      Patient Active Problem List   Diagnosis    Essential hypertension    Acquired hypothyroidism    Orthostatic hypotension    Primary adenocarcinoma of right lung [facility])        Past Surgical History:      Procedure Laterality Date    CHOLECYSTECTOMY      CORONARY ANGIOPLASTY WITH STENT PLACEMENT  2008    TONSILLECTOMY         Family History:  Family History   Problem Relation Age of Onset    Stroke Father        Medications:  Reviewed and reconciled. Social History:  Social History     Socioeconomic History    Marital status:       Spouse name: Not on file    Number of children: Not on file    Years of education: Not on file    Highest education level: Not on file   Occupational History    Not on file   Tobacco Use    Smoking status: Former    Smokeless tobacco: Never    Tobacco comments:     Pt states he quit 40-50 years ago   Vaping Use    Vaping Use: Never used   Substance and Sexual Activity    Alcohol use: Never    Drug use: Never    Sexual activity: Not on file   Other Topics Concern    Not on file   Social History Narrative    Not on file     Social Determinants of Health     Financial Resource Strain: Not on file   Food Insecurity: Not on file   Transportation Needs: Not on file   Physical Activity: Not on file   Stress: Not on file   Social Connections: Not on file   Intimate Partner Violence: Not on file   Housing Stability: Not on file       Allergies:  No Known Allergies    Physical Exam:  BP (!) 146/74   Pulse 75   Temp 99 °F (37.2 °C)   Ht 5' 9\" (1.753 m)   Wt 186 lb 8 oz (84.6 kg)   SpO2 90%   BMI 27.54 kg/m²   GENERAL: Alert, oriented x 3, not in acute distress. HEENT: PERRLA; EOMI. Oropharynx clear. NECK: Supple. Palpable lymph node in the right upper neck. LUNGS: Good air entry bilaterally. No wheezing, crackles or rhonchi. CARDIOVASCULAR: Regular rate. No murmurs, rubs or gallops. ABDOMEN: Soft. Non-tender, non-distended. Positive bowel sounds. EXTREMITIES: Without clubbing, cyanosis, or edema. NEUROLOGIC: No focal deficits.    ECOG PS 1      Impression/Plan:     Mr. Estrella Jenkins is a pleasant 28-year-old gentleman, with a past medical history significant for hypertension, hyperlipidemia and hypothyroidism, who was found to have a right neck mass when he had a work-up done for right hand numbness, he had on 6/28/2022 and neck CT scan done, revealing enlarged necrotic lymph nodes on the right side of the neck, possible mediastinal and right hilar adenopathy, he had a chest CT scan done on 7/8/2022, revealing right lower lobe nodule, measuring up to 2.5 cm, highly suspicious for primary neoplasm, additional subcentimeter nodule in the left lower lobe, nonspecific, metastatic disease is not excluded, bulky mediastinal and right hilar adenopathy, compatible with steven metastatic disease, compression of the right middle lobe bronchus by right hilar adenopathy with associated volume loss of the right middle lobe, the patient had onset 11/20/2020 right neck mass biopsy, cytology was positive for malignant cells, poorly differentiated adenocarcinoma, the flow cytometry had revealed monotypic CD5 positive, CD23 positive B-cell population, with 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 The patient has a newly diagnosed non small cell lung cancer, adenocarcinoma with bulky mediastinal and right hilar adenopathy, metastatic disease to the right upper neck, he has subcentimeter nodules in the left lower lobe, discussed with the patient and his son diagnosis and prognosis, recommended to complete the staging work-up. PET scan was done on 8/13/2022, revealing FDG avid right cervical and supraclavicular adenopathy, soft tissue mass in the right lung, additional smaller nodules consistent with metastasis, mediastinal and right hilar lymphadenopathy, lymphadenopathy, right adrenal and liver hypermetabolic metastasis, brain MRI is negative for metastatic disease. The patient has non small cell lung cancer, metastatic to the lymph node in the right neck, liver and adrenal gland, the uptake in the lymph nodes in the bones could be secondary to CLL/SLL or lung ca. MRI of the lumbar spine and pelvis were ordered. Await results. I discussed with the patient diagnosis, prognosis, and recommendations for treatment, PD-L1 expression is 90%, he is a candidate for single agent Keytruda, there was not enough tissue to send for NGS testing, foundation 1 liquid biopsy was ordered. We discussed that RT can be done for palliation. Today 8/23/2022, the patient will be started on single agent Keytruda, labs reviewed, he is doing well clinically, proceed with Keytruda, cycle #1. Creatinine is 1.6, he will increase oral fluid intake, will recheck next week. Referral was placed to palliative medicine for symptoms management. RTC in 1 week. Thank you for allowing us to participate in the care of Mr. Faiza Ureña.     Mary Ellen Meeks MD HEMATOLOGY/MEDICAL [address] [facility]  [address] [facility] MED ONCOLOGY  [address] [facility] 29. [phone]  Dept: [place]: [phone]

## 2022-08-24 PROBLEM — N17.9 AKI (ACUTE KIDNEY INJURY) (HCC): Status: ACTIVE | Noted: 2022-01-01

## 2022-08-24 PROBLEM — J96.21 ACUTE ON CHRONIC RESPIRATORY FAILURE WITH HYPOXIA (HCC): Status: ACTIVE | Noted: 2022-01-01

## 2022-08-24 PROBLEM — I21.4 NSTEMI (NON-ST ELEVATED MYOCARDIAL INFARCTION) (HCC): Status: ACTIVE | Noted: 2022-01-01

## 2022-08-24 PROBLEM — R74.01 TRANSAMINITIS: Status: ACTIVE | Noted: 2022-01-01

## 2022-08-24 NOTE — PROGRESS NOTES
Physical Therapy    PT order received and medical chart reviewed 8/24. RN reports pt is lethargic with low BP and requested PT to hold initial evaluation at this time. Will re-attempt as able. Thank you.     Tonya James, PT, DPT  [id number]

## 2022-08-24 NOTE — ED PROVIDER NOTES
[facility]      Pt Name: Aman Davidson  MRN: [[id number]]  [place] 1935  Date of evaluation: 8/23/2022      CHIEF COMPLAINT       Chief Complaint   Patient presents with    Fatigue     Patient has metastatic CA, received a treatment yesterday and has been fatigued since. Upon EMS arrival patient was 88/52 and 70% RA. HPI  Aman Davidson is a 82 y.o. male history of thyroid and lung cancer, he is not on anticoagulation, recent treatment yesterday by hematology oncology, presents today with fatigue per chief complaint. EMS found patient saturating at 72% on room air. Patient does not take oxygen normally. Per them he was minimally responsive. When they gave the patient oxygen he \"perked up. \"  Patient states that he just felt fatigued ever since that he got immunotherapy yesterday. No alleviating or exacerbating factors. Denies any medications measure alleviate symptoms. Denies fevers, chills, nausea, vomiting, chest pain, abdominal pain, flank pain, dysuria, hematuria, diarrhea, constipation, new rashes or sores. Except as noted above the remainder of the review of systems was reviewed and negative. Review of Systems   Constitutional:  Positive for fatigue. Negative for activity change, appetite change, diaphoresis, fever and unexpected weight change. HENT:  Negative for congestion. Eyes:  Negative for visual disturbance. Respiratory:  Positive for shortness of breath. Negative for cough, wheezing and stridor. Cardiovascular:  Negative for chest pain, palpitations and leg swelling. Gastrointestinal:  Negative for abdominal distention, abdominal pain, constipation, diarrhea, nausea and vomiting. Endocrine: Negative for polyphagia and polyuria. Genitourinary:  Negative for dysuria and hematuria. Musculoskeletal:  Positive for myalgias. Negative for back pain. Skin:  Negative for color change, pallor, rash and wound.    Neurological: Negative for dizziness and syncope. Hematological:  Negative for adenopathy. Does not bruise/bleed easily. Psychiatric/Behavioral:  Negative for agitation. Physical Exam  Vitals and nursing note reviewed. Constitutional:       General: He is not in acute distress. Appearance: Normal appearance. He is not ill-appearing or diaphoretic. HENT:      Head: Normocephalic and atraumatic. Right Ear: External ear normal.      Left Ear: External ear normal.      Nose: Nose normal.      Mouth/Throat:      Mouth: Mucous membranes are dry. Pharynx: No oropharyngeal exudate or posterior oropharyngeal erythema. Eyes:      Extraocular Movements: Extraocular movements intact. Pupils: Pupils are equal, round, and reactive to light. Cardiovascular:      Rate and Rhythm: Regular rhythm. Tachycardia present. Pulses: Normal pulses. Heart sounds: Normal heart sounds. Pulmonary:      Effort: Respiratory distress present. Breath sounds: Normal breath sounds. Abdominal:      General: Abdomen is flat. Palpations: Abdomen is soft. Tenderness: There is no abdominal tenderness. There is no right CVA tenderness, left CVA tenderness or rebound. Negative signs include Gonzalez's sign, Rovsing's sign and McBurney's sign. Musculoskeletal:         General: No signs of injury. Normal range of motion. Cervical back: Normal range of motion. Right lower leg: No edema. Left lower leg: No edema. Skin:     General: Skin is warm and dry. Capillary Refill: Capillary refill takes less than 2 seconds. Neurological:      General: No focal deficit present. Mental Status: He is alert and oriented to person, place, and time.    Psychiatric:         Mood and Affect: Mood normal.        Procedures     MDM  Number of Diagnoses or Management Options  Acute respiratory failure with hypoxia (HCC)  Elevated LFTs  Malignant neoplasm of lower lobe of right lung (HCC)  NSTEMI (non-ST --------------------------------------------- PAST HISTORY ---------------------------------------------  Past Medical History:  has a past medical history of Cancer ([facility] [address] Hyperlipidemia, Hypertension, and Hypothyroidism. Past Surgical History:  has a past surgical history that includes Coronary angioplasty with stent (2008); Cholecystectomy; and Tonsillectomy. Social History:  reports that he has quit smoking. He has never used smokeless tobacco. He reports that he does not drink alcohol and does not use drugs. Family History: family history includes Stroke in his father. The patients home medications have been reviewed. Allergies: Patient has no known allergies.     -------------------------------------------------- RESULTS -------------------------------------------------    LABS:  Results for orders placed or performed during the hospital encounter of 08/23/22   Culture, Urine    Specimen: Urine, clean catch   Result Value Ref Range    Urine Culture, Routine       <10,000 CFU/mL  Mixed gram positive organisms  Gram negative rods     Culture, Blood 1    Specimen: Blood   Result Value Ref Range    Blood Culture, Routine 24 Hours no growth    Culture, Blood 2    Specimen: Blood   Result Value Ref Range    Culture, Blood 2 24 Hours no growth    COVID-19, Rapid    Specimen: Nasopharyngeal Swab   Result Value Ref Range    SARS-CoV-2, NAAT Not Detected Not Detected   CBC with Auto Differential   Result Value Ref Range    WBC 15.4 (H) 4.5 - 11.5 E9/L    RBC 4.87 3.80 - 5.80 E12/L    Hemoglobin 13.2 12.5 - 16.5 g/dL    Hematocrit 42.8 37.0 - 54.0 %    MCV 87.9 80.0 - 99.9 fL    MCH 27.1 26.0 - 35.0 pg    MCHC 30.8 (L) 32.0 - 34.5 %    RDW 16.6 (H) 11.5 - 15.0 fL    Platelets 724 996 - 409 E9/L    MPV 9.9 7.0 - 12.0 fL    Neutrophils % 79.5 43.0 - 80.0 %    Immature Granulocytes % 1.5 0.0 - 5.0 %    Lymphocytes % 12.7 (L) 20.0 - 42.0 %    Monocytes % 5.8 2.0 - 12.0 %    Eosinophils % 0.1 0.0 - 6.0 % Basophils % 0.4 0.0 - 2.0 %    Neutrophils Absolute 12.24 (H) 1.80 - 7.30 E9/L    Immature Granulocytes # 0.23 E9/L    Lymphocytes Absolute 1.96 1.50 - 4.00 E9/L    Monocytes Absolute 0.89 0.10 - 0.95 E9/L    Eosinophils Absolute 0.02 (L) 0.05 - 0.50 E9/L    Basophils Absolute 0.06 0.00 - 0.20 E9/L   Comprehensive Metabolic Panel   Result Value Ref Range    Sodium 141 132 - 146 mmol/L    Potassium 4.7 3.5 - 5.0 mmol/L    Chloride 99 98 - 107 mmol/L    CO2 26 22 - 29 mmol/L    Anion Gap 16 7 - 16 mmol/L    Glucose 147 (H) 74 - 99 mg/dL    BUN 48 (H) 6 - 23 mg/dL    Creatinine 2.2 (H) 0.7 - 1.2 mg/dL    GFR Non-African American 29 >=60 mL/min/1.73    GFR African American 35     Calcium 8.7 8.6 - 10.2 mg/dL    Total Protein 7.2 6.4 - 8.3 g/dL    Albumin 3.8 3.5 - 5.2 g/dL    Total Bilirubin 1.3 (H) 0.0 - 1.2 mg/dL    Alkaline Phosphatase 133 (H) 40 - 129 U/L     (H) 0 - 40 U/L     (H) 0 - 39 U/L   Magnesium   Result Value Ref Range    Magnesium 2.3 1.6 - 2.6 mg/dL   Troponin   Result Value Ref Range    Troponin, High Sensitivity 143 (H) 0 - 11 ng/L   Brain Natriuretic Peptide   Result Value Ref Range    Pro-BNP 5,104 (H) 0 - 450 pg/mL   Protime-INR   Result Value Ref Range    Protime 15.1 (H) 9.3 - 12.4 sec    INR 1.4    APTT   Result Value Ref Range    aPTT 26.1 24.5 - 35.1 sec   Urinalysis with Microscopic   Result Value Ref Range    Color, UA Yellow Straw/Yellow    Clarity, UA Clear Clear    Glucose, Ur Negative Negative mg/dL    Bilirubin Urine SMALL (A) Negative    Ketones, Urine Negative Negative mg/dL    Specific Gravity, UA 1.015 1.005 - 1.030    Blood, Urine Negative Negative    pH, UA 6.0 5.0 - 9.0    Protein, UA TRACE Negative mg/dL    Urobilinogen, Urine 4.0 (A) <2.0 E.U./dL    Nitrite, Urine Negative Negative    Leukocyte Esterase, Urine Negative Negative    WBC, UA 0-1 0 - 5 /HPF    RBC, UA 1-3 0 - 2 /HPF    Bacteria, UA FEW (A) None Seen /HPF   Lactate, Sepsis   Result Value Ref Range Lactic Acid, Sepsis 1.9 0.5 - 1.9 mmol/L   Lactate, Sepsis   Result Value Ref Range    Lactic Acid, Sepsis 1.1 0.5 - 1.9 mmol/L   TSH   Result Value Ref Range    TSH 1.280 0.270 - 4.200 uIU/mL   Uric Acid   Result Value Ref Range    Uric Acid, Serum 7.5 (H) 3.4 - 7.0 mg/dL   Phosphorus   Result Value Ref Range    Phosphorus 6.0 (H) 2.5 - 4.5 mg/dL   Troponin   Result Value Ref Range    Troponin, High Sensitivity 201 (H) 0 - 11 ng/L   Troponin   Result Value Ref Range    Troponin, High Sensitivity 223 (H) 0 - 11 ng/L   Troponin   Result Value Ref Range    Troponin, High Sensitivity 249 (H) 0 - 11 ng/L   Hepatic function panel   Result Value Ref Range    Total Protein 6.3 (L) 6.4 - 8.3 g/dL    Albumin 3.2 (L) 3.5 - 5.2 g/dL    Alkaline Phosphatase 117 40 - 129 U/L     (H) 0 - 40 U/L     (H) 0 - 39 U/L    Total Bilirubin 0.9 0.0 - 1.2 mg/dL    Bilirubin, Direct 0.5 (H) 0.0 - 0.3 mg/dL    Bilirubin, Indirect 0.4 0.0 - 1.0 mg/dL   Magnesium   Result Value Ref Range    Magnesium 2.2 1.6 - 2.6 mg/dL   Hemoglobin A1c   Result Value Ref Range    Hemoglobin A1C 5.2 4.0 - 5.6 %   Lipid panel - fasting   Result Value Ref Range    Cholesterol, Total 106 0 - 199 mg/dL    Triglycerides 79 0 - 149 mg/dL    HDL 38 >40 mg/dL    LDL Calculated 52 0 - 99 mg/dL    VLDL Cholesterol Calculated 16 mg/dL   CBC with Auto Differential   Result Value Ref Range    WBC 12.9 (H) 4.5 - 11.5 E9/L    RBC 4.45 3.80 - 5.80 E12/L    Hemoglobin 11.8 (L) 12.5 - 16.5 g/dL    Hematocrit 37.5 37.0 - 54.0 %    MCV 84.3 80.0 - 99.9 fL    MCH 26.5 26.0 - 35.0 pg    MCHC 31.5 (L) 32.0 - 34.5 %    RDW 16.5 (H) 11.5 - 15.0 fL    Platelets 545 849 - 456 E9/L    MPV 9.7 7.0 - 12.0 fL    Neutrophils % 76.5 43.0 - 80.0 %    Immature Granulocytes % 1.3 0.0 - 5.0 %    Lymphocytes % 15.1 (L) 20.0 - 42.0 %    Monocytes % 6.2 2.0 - 12.0 %    Eosinophils % 0.5 0.0 - 6.0 %    Basophils % 0.4 0.0 - 2.0 %    Neutrophils Absolute 9.89 (H) 1.80 - 7.30 E9/L Immature Granulocytes # 0.17 E9/L    Lymphocytes Absolute 1.95 1.50 - 4.00 E9/L    Monocytes Absolute 0.80 0.10 - 0.95 E9/L    Eosinophils Absolute 0.06 0.05 - 0.50 E9/L    Basophils Absolute 0.05 0.00 - 0.20 N2/I   Basic Metabolic Panel   Result Value Ref Range    Sodium 141 132 - 146 mmol/L    Potassium 4.4 3.5 - 5.0 mmol/L    Chloride 99 98 - 107 mmol/L    CO2 28 22 - 29 mmol/L    Anion Gap 14 7 - 16 mmol/L    Glucose 103 (H) 74 - 99 mg/dL    BUN 51 (H) 6 - 23 mg/dL    Creatinine 2.1 (H) 0.7 - 1.2 mg/dL    GFR Non-African American 30 >=60 mL/min/1.73    GFR African American 36     Calcium 8.2 (L) 8.6 - 10.2 mg/dL   Procalcitonin   Result Value Ref Range    Procalcitonin 0.40 (H) 0.00 - 0.08 ng/mL   CBC with Auto Differential   Result Value Ref Range    WBC 10.4 4.5 - 11.5 E9/L    RBC 4.14 3.80 - 5.80 E12/L    Hemoglobin 11.3 (L) 12.5 - 16.5 g/dL    Hematocrit 36.6 (L) 37.0 - 54.0 %    MCV 88.4 80.0 - 99.9 fL    MCH 27.3 26.0 - 35.0 pg    MCHC 30.9 (L) 32.0 - 34.5 %    RDW 16.1 (H) 11.5 - 15.0 fL    Platelets 430 939 - 300 E9/L    MPV 10.0 7.0 - 12.0 fL    Neutrophils % 75.0 43.0 - 80.0 %    Immature Granulocytes % 1.4 0.0 - 5.0 %    Lymphocytes % 12.1 (L) 20.0 - 42.0 %    Monocytes % 6.7 2.0 - 12.0 %    Eosinophils % 4.2 0.0 - 6.0 %    Basophils % 0.6 0.0 - 2.0 %    Neutrophils Absolute 7.78 (H) 1.80 - 7.30 E9/L    Immature Granulocytes # 0.15 E9/L    Lymphocytes Absolute 1.25 (L) 1.50 - 4.00 E9/L    Monocytes Absolute 0.69 0.10 - 0.95 E9/L    Eosinophils Absolute 0.44 0.05 - 0.50 E9/L    Basophils Absolute 0.06 0.00 - 0.20 E9/L   Comprehensive Metabolic Panel   Result Value Ref Range    Sodium 138 132 - 146 mmol/L    Potassium 4.3 3.5 - 5.0 mmol/L    Chloride 101 98 - 107 mmol/L    CO2 30 (H) 22 - 29 mmol/L    Anion Gap 7 7 - 16 mmol/L    Glucose 88 74 - 99 mg/dL    BUN 41 (H) 6 - 23 mg/dL    Creatinine 1.6 (H) 0.7 - 1.2 mg/dL    GFR Non-African American 41 >=60 mL/min/1.73    GFR African American 50     Calcium 7.7 (L) 8.6 - 10.2 mg/dL    Total Protein 5.6 (L) 6.4 - 8.3 g/dL    Albumin 2.9 (L) 3.5 - 5.2 g/dL    Total Bilirubin 0.8 0.0 - 1.2 mg/dL    Alkaline Phosphatase 105 40 - 129 U/L     (H) 0 - 40 U/L    AST 92 (H) 0 - 39 U/L   Comprehensive Metabolic Panel   Result Value Ref Range    Sodium 138 132 - 146 mmol/L    Potassium 5.0 3.5 - 5.0 mmol/L    Chloride 104 98 - 107 mmol/L    CO2 27 22 - 29 mmol/L    Anion Gap 7 7 - 16 mmol/L    Glucose 104 (H) 74 - 99 mg/dL    BUN 31 (H) 6 - 23 mg/dL    Creatinine 1.3 (H) 0.7 - 1.2 mg/dL    GFR Non-African American 52 >=60 mL/min/1.73    GFR African American >60     Calcium 7.8 (L) 8.6 - 10.2 mg/dL    Total Protein 5.9 (L) 6.4 - 8.3 g/dL    Albumin 3.0 (L) 3.5 - 5.2 g/dL    Total Bilirubin 0.8 0.0 - 1.2 mg/dL    Alkaline Phosphatase 114 40 - 129 U/L     (H) 0 - 40 U/L    AST 48 (H) 0 - 39 U/L   CBC   Result Value Ref Range    WBC 10.4 4.5 - 11.5 E9/L    RBC 4.55 3.80 - 5.80 E12/L    Hemoglobin 12.0 (L) 12.5 - 16.5 g/dL    Hematocrit 39.1 37.0 - 54.0 %    MCV 85.9 80.0 - 99.9 fL    MCH 26.4 26.0 - 35.0 pg    MCHC 30.7 (L) 32.0 - 34.5 %    RDW 16.3 (H) 11.5 - 15.0 fL    Platelets 812 025 - 584 E9/L    MPV 9.7 7.0 - 12.0 fL   Brain Natriuretic Peptide   Result Value Ref Range    Pro-BNP 20,628 (H) 0 - 450 pg/mL   CBC with Auto Differential   Result Value Ref Range    WBC 10.9 4.5 - 11.5 E9/L    RBC 4.83 3.80 - 5.80 E12/L    Hemoglobin 12.7 12.5 - 16.5 g/dL    Hematocrit 41.9 37.0 - 54.0 %    MCV 86.7 80.0 - 99.9 fL    MCH 26.3 26.0 - 35.0 pg    MCHC 30.3 (L) 32.0 - 34.5 %    RDW 15.9 (H) 11.5 - 15.0 fL    Platelets 549 234 - 226 E9/L    MPV 9.8 7.0 - 12.0 fL    Neutrophils % 77.3 43.0 - 80.0 %    Immature Granulocytes % 1.9 0.0 - 5.0 %    Lymphocytes % 10.6 (L) 20.0 - 42.0 %    Monocytes % 6.4 2.0 - 12.0 %    Eosinophils % 2.9 0.0 - 6.0 %    Basophils % 0.9 0.0 - 2.0 %    Neutrophils Absolute 8.45 (H) 1.80 - 7.30 E9/L    Immature Granulocytes # 0.21 E9/L    Lymphocytes Absolute 1.16 (L) 1.50 - 4.00 E9/L    Monocytes Absolute 0.70 0.10 - 0.95 E9/L    Eosinophils Absolute 0.32 0.05 - 0.50 E9/L    Basophils Absolute 0.10 0.00 - 0.20 Q7/G   Basic Metabolic Panel   Result Value Ref Range    Sodium 136 132 - 146 mmol/L    Potassium 4.5 3.5 - 5.0 mmol/L    Chloride 99 98 - 107 mmol/L    CO2 27 22 - 29 mmol/L    Anion Gap 10 7 - 16 mmol/L    Glucose 101 (H) 74 - 99 mg/dL    BUN 26 (H) 6 - 23 mg/dL    Creatinine 1.2 0.7 - 1.2 mg/dL    GFR Non-African American 58 >=60 mL/min/1.73    GFR African American >60     Calcium 8.3 (L) 8.6 - 10.2 mg/dL   EKG 12 Lead   Result Value Ref Range    Ventricular Rate 89 BPM    Atrial Rate 89 BPM    P-R Interval 208 ms    QRS Duration 98 ms    Q-T Interval 372 ms    QTc Calculation (Bazett) 452 ms    R Axis -26 degrees    T Axis -15 degrees   EKG 12 Lead   Result Value Ref Range    Ventricular Rate 100 BPM    Atrial Rate 100 BPM    P-R Interval 198 ms    QRS Duration 100 ms    Q-T Interval 370 ms    QTc Calculation (Bazett) 477 ms    P Axis 21 degrees    R Axis -7 degrees    T Axis 32 degrees   EKG 12 lead   Result Value Ref Range    Ventricular Rate 124 BPM    Atrial Rate 250 BPM    QRS Duration 82 ms    Q-T Interval 336 ms    QTc Calculation (Bazett) 482 ms    P Axis -138 degrees    R Axis -27 degrees    T Axis -17 degrees       RADIOLOGY:  XR CHEST PORTABLE   Final Result   Increased pulmonary vascular congestion and increased atelectasis or   pneumonia in left lung base with probable left pleural effusion. XR CHEST PORTABLE   Final Result   No acute process. CTA PULMONARY W CONTRAST   Final Result   No evidence of pulmonary embolism. 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 Further evaluation and workup is recommended which could   include PET-CT and/or tissue sampling. Multiple enlarged upper mediastinal lymph nodes, the largest of which is 3.2   cm in the precarinal region. Mild-to-moderate cardiomegaly. EKG:  Please refer to work-up tab for EKG read.    ------------------------- NURSING NOTES AND VITALS REVIEWED ---------------------------  Date / Time Roomed:  8/23/2022 10:04 PM  ED Bed Assignment:  [id number]    The nursing notes within the ED encounter and vital signs as below have been reviewed. Patient Vitals for the past 24 hrs:   BP Temp Temp src Pulse Resp SpO2 Weight   08/27/22 1522 134/63 97.1 °F (36.2 °C) Temporal 71 -- 99 % --   08/27/22 0751 118/60 (!) 96.4 °F (35.8 °C) Temporal 75 18 95 % --   08/27/22 0730 -- -- -- -- -- -- 207 lb 12.8 oz (94.3 kg)   08/26/22 2345 (!) 117/55 97.7 °F (36.5 °C) Temporal 70 16 98 % --   08/26/22 2045 117/67 97.8 °F (36.6 °C) Oral 75 18 100 % --       Oxygen Saturation Interpretation: Improved after treatment    ------------------------------------------ PROGRESS NOTES ------------------------------------------    Counseling:  I have spoken with the patient and discussed todays results, in addition to providing specific details for the plan of care and counseling regarding the diagnosis and prognosis. Their questions are answered at this time and they are agreeable with the plan of admission.    --------------------------------- ADDITIONAL PROVIDER NOTES ---------------------------------  Consultations:  Spoke with Dr. Manan alejo. Discussed case. They will admit the patient. Please note that the withdrawal or failure to initiate urgent interventions for this patient would likely result in a life threatening deterioration or permanent disability. Accordingly this patient received 30 minutes of critical care time, excluding separately billable procedures.       This patient's ED course included: a personal history and physicial examination, re-evaluation prior to disposition, multiple bedside re-evaluations, IV medications, cardiac monitoring, and continuous pulse oximetry    This patient has remained hemodynamically stable during their ED course. Diagnosis:  1. NSTEMI (non-ST elevated myocardial infarction) (HCC)    2. Elevated LFTs    3. Acute respiratory failure with hypoxia (HCC)    4. Pulmonary mass    5. Malignant neoplasm of lower lobe of right lung (HCC)        Disposition:  Patient's disposition: Admit to telemetry  Patient's condition is fair.           Christina Chauhan MD  Resident  08/27/22 2003

## 2022-08-24 NOTE — CARE COORDINATION
Spoke with Pt and Son Aneta Gutierrez about Transition Plan of Care. Pt lives alone with no steps to enter Sons live close by. Pt uses a ww or cane PRN. PCP: Dr. Lauri Pike. Pharmacy: [facility]. Pt's goal is to return home if able. Pt does not have home oxygen. SW/CM to follow for discharge needs.    Julia Arnold, L.S.W.  [phone]

## 2022-08-24 NOTE — ED NOTES
Radiology Procedure Waiver   Name: Phuong Richard  : 1935  MRN: [id number]    Date:  22    Time: 10:18 PM EDT    Benefits of immediately proceeding with radiology exam(s) without pre-testing outweigh the risks or are not indicated as specified below and therefore the following is/are being waived:    [x] Benefits of immediate radiology exam(s) outweigh any risk. OR    Pre-exam testing is not indicated for the following reason(s):  [] Pregnancy test   [] Patients LMP on-time and regular.   [] Patient had Tubal Ligation or has other Contraception Device. [] Patient  is Menopausal or Premenarcheal.    [] Patient had Full or Partial Hysterectomy. [] Protocol for CT contrast allegry   [] Patient has tolerated well previously   [] Patient does not have a true allergy    [] MRI Questionnaire     [] BUN/Creatinine   [] Patient age w/no hx of renal dysfunction. [] Patient on Dialysis. [] Recent Normal Labs.   Electronically signed by Krupa Farnsworth MD on 22 at 10:18 PM EDT               Krupa Farnsworth MD  Resident  [phone]

## 2022-08-24 NOTE — H&P
Medications Prior to Admission:    Medications Prior to Admission: nitroGLYCERIN (NITROSTAT) 0.4 MG SL tablet,   sertraline (ZOLOFT) 50 MG tablet, TAKE 1 TABLET BY MOUTH EVERY DAY  traMADol (ULTRAM) 50 MG tablet, Take 1 tablet by mouth every 6 hours as needed for Pain for up to 7 days. Take lowest dose possible to manage pain  metoprolol tartrate (LOPRESSOR) 25 MG tablet, TAKE 1/2 TABLET BY MOUTH EVERY DAY  nitroGLYCERIN (NITROSTAT) 0.4 MG SL tablet, PLEASE SEE ATTACHED FOR DETAILED DIRECTIONS  midodrine (PROAMATINE) 2.5 MG tablet, Take 2.5 mg by mouth 3 times daily  Multiple Vitamins-Minerals (THERAPEUTIC MULTIVITAMIN-MINERALS) tablet, Take 1 tablet by mouth daily  sertraline (ZOLOFT) 25 MG tablet, Take 25 mg by mouth daily  pravastatin (PRAVACHOL) 40 MG tablet, Take 40 mg by mouth daily  levothyroxine (SYNTHROID) 75 MCG tablet, Take 75 mcg by mouth daily  aspirin 81 MG tablet, Take 81 mg by mouth daily  Cholecalciferol (VITAMIN D3) 64389 units CAPS, Take by mouth every 14 days    Note that the patient's home medications were reviewed and the above list is accurate to the best of my knowledge at the time of the exam.    Allergies:    Patient has no known allergies. Social History:    reports that he has quit smoking. He has never used smokeless tobacco. He reports that he does not drink alcohol and does not use drugs. Family History:   family history includes Stroke in his father.       PHYSICAL EXAM:    Vitals:  BP (!) 89/58   Pulse (!) 110   Temp 97 °F (36.1 °C)   Resp 16   Ht 5' 9\" (1.753 m)   Wt 186 lb (84.4 kg)   SpO2 94%   BMI 27.47 kg/m²       General appearance: NAD, conversant, slow to respond   Eyes: Sclerae anicteric, PERRLA  HEENT: AT/NC, MMM  Neck: FROM, supple, no thyromegaly  Lymph: No cervical / supraclavicular lymphadenopathy  Lungs: Diminished bilaterally, 4L NC  CV: Tachycardia, no MRGs, 2+ lower extremity edema  Abdomen: Soft, non-tender; no masses or HSM, +BS  Extremities: FROM without synovitis. No clubbing or cyanosis of the hands. Skin: no rash, induration, lesions, or ulcers  Psych: Calm and cooperative. Normal judgement and insight. Normal mood and affect. Neuro: Alert and interactive, face symmetric, speech fluent. LABS:  All labs reviewed. Of note:  CBC:   Lab Results   Component Value Date/Time    WBC 12.9 08/24/2022 04:50 AM    RBC 4.45 08/24/2022 04:50 AM    HGB 11.8 08/24/2022 04:50 AM    HCT 37.5 08/24/2022 04:50 AM    MCV 84.3 08/24/2022 04:50 AM    MCH 26.5 08/24/2022 04:50 AM    MCHC 31.5 08/24/2022 04:50 AM    RDW 16.5 08/24/2022 04:50 AM     08/24/2022 04:50 AM    MPV 9.7 08/24/2022 04:50 AM     CMP:    Lab Results   Component Value Date/Time     08/24/2022 04:50 AM    K 4.4 08/24/2022 04:50 AM    K 4.8 08/28/2019 06:12 AM    CL 99 08/24/2022 04:50 AM    CO2 28 08/24/2022 04:50 AM    BUN 51 08/24/2022 04:50 AM    CREATININE 2.1 08/24/2022 04:50 AM    GFRAA 36 08/24/2022 04:50 AM    LABGLOM 30 08/24/2022 04:50 AM    GLUCOSE 103 08/24/2022 04:50 AM    PROT 6.3 08/24/2022 04:50 AM    LABALBU 3.2 08/24/2022 04:50 AM    CALCIUM 8.2 08/24/2022 04:50 AM    BILITOT 0.9 08/24/2022 04:50 AM    ALKPHOS 117 08/24/2022 04:50 AM     08/24/2022 04:50 AM     08/24/2022 04:50 AM       Imaging:  CTA pulmonary: No evidence of PE.  2.7 centimeters mass in the posterior basal segment of the right lower lobe with pleural attachment. Multiple enlarged upper mediastinal lymph nodes the largest of which is 3.7 cm in the precarinal region. Mid to moderate cardiomegaly.     EKG:  Atrial flutter with variable AV block  Inferior infarct (cited on or before 23-AUG-2022)    Telemetry:  A-Flutter     ASSESSMENT/PLAN:  Principal Problem:    Acute on chronic respiratory failure with hypoxia (HCC)  Active Problems:    Primary adenocarcinoma of right lung (HCC)    NSTEMI (non-ST elevated myocardial infarction) ([facility] [address]    Transaminitis    MEI (acute kidney injury) [facility])    Essential hypertension  Resolved Problems:    * No resolved hospital problems. *    Patient is an 72-year-old male w/ hx of primary non-small cell lung adenocarcinoma of right lung with mets to the right upper neck who is admitted to [facility] for  Acute respiratory failure with hypoxia  -Monitor labs  -Procalcitonin 0.40  -Check respiratory panel  -DuoNebs as needed  -Currently requiring 4 L O2  -Patient recently initiated on Vernia Canny a couple of days ago by primary oncology team  -Recent PET scan with metastatic disease, recent brain MRI negative for metastatic disease, I do not see a bone scan    NSTEMI/volume overload with new onset of Aflutter   -Monitor labs, patient not likely candidate for intervention/PCI  -Serial troponin [id number]  -proBNP 5,104  -Echocardiogram  -Cardiology consulted  -currently not on any [address]     MEI-likely from dehydration, failure to thrive  -Monitor labs  -48/2.2 > 51/2.1  -Baseline creatinine 1.3  -Avoid nephrotoxins  -IVF NS @ 75    Transaminates-likely from liver mets  -Monitor labs  -ALT 15 > 384  -AST 23 > 395  -Total bili 1.3  -Denies any abdominal pain  -? Hold statin-yes     Medication for other comorbidities continue as appropriate dose adjustment as necessary. Palliative care service to follow for ongoing symptom management      DVT prophylaxis Lovenox   PT OT  Discharge planning    Case discussed with attending and agreed upon plan of care. Code status: Full  Requires Inpatient level of care  GREG Goldberg CNP    12:04 PM  8/24/2022     Above note edited to reflect my thoughts     I personally saw, examined and provided care for the patient. Radiographs, labs and medication list were reviewed by me independently. The case was discussed in detail and plans for care were established. Review of CESAR Goldberg   , documentation was conducted and revisions were made as appropriate directly by me.  I agree with the above documented exam, problem list, and plan of care.      Cris Wilhelm MD  3:46 PM  8/24/2022

## 2022-08-24 NOTE — ACP (ADVANCE CARE PLANNING)
Advance Care Planning   Healthcare Decision Maker:    Primary Decision Maker: Liam Mora - Child - [phone]    Secondary Decision Maker: Óscar Craven - Child - [phone]    Click here to complete Healthcare Decision Makers including selection of the Healthcare Decision Maker Relationship (ie \"Primary\"). Today we documented Decision Maker(s) consistent with Legal Next of Kin hierarchy.        Joleen Carney, L.S.W.  [phone]

## 2022-08-24 NOTE — CONSULTS
[facility] cardiology/[facility] of [address] [place] [address]    Name: López Estevez    Age: 82 y.o. Date of Admission: 8/23/2022 10:04 PM    Date of Service: 8/24/2022    Reason for Consultation: Elevated troponin 143 then 201, 222, 249    Referring Physician:     History of Present Illness: The patient is a 82y.o. year old male who was recently diagnosed with lung cancer about 3 weeks ago, chronic hypertension also prior orthostatic hypotension, echocardiogram August 2019 LVEF 60% mild concentric left hypertrophy, hyperlipidemia, coronary artery disease with a stent to the right coronary artery drug-eluting May 4, 2009. His son Sea Fields is at bedside throughout the interview and he is very helpful. Patient is conversant pleasant and appropriate today. Little tired appearing. He denies any chest pain shortness of breath or distress currently. By patient's report he has become progressively weak over the last week and his son reports he has had difficulty getting out of a chair and walking. No reported falls. No fevers chills nausea vomiting diarrhea. Has somewhat been eating and drinking all right. He denies any indication of syncope. He is unaware of any tachycardia or fast heart rate. He is up more awake today than yesterday. Sea Fields his son reports yesterday he was almost a little lethargic and not terribly interactive or responsive. No reported symptoms of a focal stroke. Past Medical History: As above and echocardiogram August 2019 LVEF 60% mild LVH. No known history of atrial fibrillation or stroke. Just started [facility] for lung cancer treatment 2 days ago. Past surgical history: Cholecystectomy tonsillectomy. Social history does not smoke alcohol consumption.       Review of Systems:     Constitutional: No fever, chills, sweats  Cardiac: As per HPI  Pulmonary: No cough, wheeze, hemoptysis  HEENT: No visual disturbances or difficult swallowing  GI: No nausea, vomiting, diarrhea, abdominal pain, rectal bleeding  : No dysuria or hematuria  Endocrine: No excessive thirst, heat or cold intolerance. Musculoskeletal: No joint pain or muscle aches. No claudication  Skin: No skin breakdown or rashes  Neuro: No headache, confusion, or seizures  Psych: No depression, anxiety      Family History:  Family History   Problem Relation Age of Onset    Stroke Father        Social History:  Social History     Socioeconomic History    Marital status:       Spouse name: Not on file    Number of children: Not on file    Years of education: Not on file    Highest education level: Not on file   Occupational History    Not on file   Tobacco Use    Smoking status: Former    Smokeless tobacco: Never    Tobacco comments:     Pt states he quit 40-50 years ago   Vaping Use    Vaping Use: Never used   Substance and Sexual Activity    Alcohol use: Never    Drug use: Never    Sexual activity: Not on file   Other Topics Concern    Not on file   Social History Narrative    Not on file     Social Determinants of Health     Financial Resource Strain: Not on file   Food Insecurity: Not on file   Transportation Needs: Not on file   Physical Activity: Not on file   Stress: Not on file   Social Connections: Not on file   Intimate Partner Violence: Not on file   Housing Stability: Not on file       Allergies:  No Known Allergies    Current Medications:  Current Facility-Administered Medications   Medication Dose Route Frequency Provider Last Rate Last Admin    aspirin EC tablet 81 mg  81 mg Oral Daily Georganna Alessandro, APRN - CNP   81 mg at 08/24/22 1304    levothyroxine (SYNTHROID) tablet 75 mcg  75 mcg Oral Daily Georganna Alessandro, APRN - CNP   75 mcg at 08/24/22 0547    [Held by provider] metoprolol tartrate (LOPRESSOR) tablet 12.5 mg  12.5 mg Oral Daily Georganna Alessandro, APRN - CNP        pravastatin (PRAVACHOL) tablet 40 mg  40 mg Oral Daily Georganna Alessandro, APRN - CNP   40 mg at 08/24/22 1259 sertraline (ZOLOFT) tablet 50 mg  50 mg Oral Daily Skye Carissa, APRN - CNP   50 mg at 08/24/22 1259    sodium chloride flush 0.9 % injection 5-40 mL  5-40 mL IntraVENous 2 times per day Skye Carissa, APRN - CNP   10 mL at 08/24/22 1215    sodium chloride flush 0.9 % injection 5-40 mL  5-40 mL IntraVENous PRN Mercy Sita, APRN - CNP        0.9 % sodium chloride infusion   IntraVENous PRN Skye Carissa, APRN - CNP        senna (SENOKOT) tablet 8.6 mg  1 tablet Oral Daily PRN Skye Carissa, APRN - CNP        perflutren lipid microspheres (DEFINITY) injection 1.65 mg  1.5 mL IntraVENous ONCE PRN Skye Carissa, APRN - CNP        enoxaparin Sodium (LOVENOX) injection 30 mg  30 mg SubCUTAneous Daily Skye Carissa, APRN - CNP   30 mg at 08/24/22 1300    0.9 % sodium chloride infusion   IntraVENous Continuous Fabi Herring  mL/hr at 08/24/22 1258 Rate Change at 08/24/22 1258      sodium chloride      sodium chloride 150 mL/hr at 08/24/22 1258       Physical Exam:  BP (!) 91/50   Pulse (!) 123   Temp (!) 96.6 °F (35.9 °C)   Resp 18   Ht 5' 9\" (1.753 m)   Wt 186 lb (84.4 kg)   SpO2 94%   BMI 27.47 kg/m²   Weight change: Wt Readings from Last 3 Encounters:   08/23/22 186 lb (84.4 kg)   08/23/22 186 lb 8 oz (84.6 kg)   08/16/22 190 lb (86.2 kg)         General: Awake, alert, oriented x3, no acute distress  HEENT: Unremarkable  Neck: No JVD or bruits. Cardiac: IRegular rate and rhythm, normal S1 and S2, no extra heart sounds, murmurs, heaves, thrills  Resp: Lungs clear without wheezing or crackles. No accessory muscle use or retraction  Abdomen: soft, nontender, nondistended, no gross organomegaly or mass  Skin: Warm and dry, no cyanosis.   Musculoskeletal: normal tone and strength in the upper and lower extremities bilaterally  Neuro: Grossly unremarkable  Psych: Cooperative, and normal affect    Intake/Output:    Intake/Output Summary (Last 24 hours) at 8/24/2022 1727  Last data filed at 8/24/2022 1313  Gross per 24 hour   Intake 270 ml   Output 250 ml   Net 20 ml     I/O this shift:  In: 240 [P.O.:240]  Out: -     Laboratory Tests:  Lab Results   Component Value Date    CREATININE 2.1 (H) 08/24/2022    BUN 51 (H) 08/24/2022     08/24/2022    K 4.4 08/24/2022    CL 99 08/24/2022    CO2 28 08/24/2022     No results for input(s): CKTOTAL, CKMB in the last 72 hours. Invalid input(s): TROPONONI  No results found for: BNP  Lab Results   Component Value Date/Time    WBC 12.9 08/24/2022 04:50 AM    RBC 4.45 08/24/2022 04:50 AM    HGB 11.8 08/24/2022 04:50 AM    HCT 37.5 08/24/2022 04:50 AM    MCV 84.3 08/24/2022 04:50 AM    MCH 26.5 08/24/2022 04:50 AM    MCHC 31.5 08/24/2022 04:50 AM    RDW 16.5 08/24/2022 04:50 AM     08/24/2022 04:50 AM    MPV 9.7 08/24/2022 04:50 AM     Recent Labs     08/22/22  1400 08/23/22  2236 08/24/22  0450   ALKPHOS 126 133* 117   ALT 15 384* 356*   AST 23 395* 348*   PROT 7.2 7.2 6.3*   BILITOT 1.3* 1.3* 0.9   BILIDIR  --   --  0.5*   LABALBU 4.2 3.8 3.2*     Lab Results   Component Value Date/Time    MG 2.2 08/24/2022 04:50 AM     Lab Results   Component Value Date/Time    PROTIME 15.1 08/23/2022 10:36 PM    INR 1.4 08/23/2022 10:36 PM     Lab Results   Component Value Date/Time    TSH 1.280 08/23/2022 10:36 PM     No components found for: CHLPL  Lab Results   Component Value Date    TRIG 79 08/24/2022     Lab Results   Component Value Date    HDL 38 08/24/2022     Lab Results   Component Value Date    LDLCALC 52 08/24/2022     Lab Results   Component Value Date    INR 1.4 08/23/2022       Admission ECG August 23, 2022 reviewed by me revealed normal sinus rhythm heart rate of 89  QTc 452 without ischemic change. EKG done this morning at 11:15 AM August 24, 2022 shows atrial flutter heart rate of 124 QTc 482. Personally reviewed his telemetry this morning and it shows atrial flutter heart rate of 119 and at times heart rate down to 100.     ASSESSMENT / PLAN:    1. Non-ST elevation myocardial infarction based solely on troponin elevation. EKG yesterday today without ischemic change. Medical management of cardiovascular risk factors and will be started on full Eliquis anticoagulation for new onset atrial flutter. Would also start enteric-coated aspirin 81 mg daily. He does have known CAD. Continue to manage blood pressure blood sugar and lipids. #2 atrial flutter onset overnight and would control ventricular rate with beta-blocker if blood pressure will tolerate but currently somewhat hypotensive. We will give IV digoxin which will slow heart rate but not lower blood pressure. Again not much blood pressure to work with. #3 hypotension and would increase normal saline to 150 cc an hour for at least the next 2 L. #4 lung cancer and just recently started Keytruda. Defer follow-up to oncology. Suspect potentially overall poor prognosis and would appropriately conservatively manage underlying CAD. #5 chronic CAD drug-eluting stent to the right coronary artery May 4, 2009. Echocardiogram pending to be done and reviewed. To assess LV function and valves.             Electronically signed by Doris Shi MD on 8/24/2022 at 1:53 PM

## 2022-08-24 NOTE — CONSULTS
Inpatient Medical Oncology Consult Note    Reason for Visit:   Consultation on a patient with NSCLC    Referring Physician:  Ligia Escalera MD    PCP:  Lindsey Macias DO    History of Present Illness:  27-year-old gentleman, with a past medical history significant for hypertension, hyperlipidemia and hypothyroidism, who was found to have a right neck mass when he had a work-up done for right hand numbness, he had on 6/28/2022 and neck CT scan done, revealing enlarged necrotic lymph nodes on the right side of the neck, possible mediastinal and right hilar adenopathy, he had a chest CT scan done on 7/8/2022, revealing right lower lobe nodule, measuring up to 2.5 cm, highly suspicious for primary neoplasm, additional subcentimeter nodule in the left lower lobe, nonspecific, metastatic disease is not excluded, bulky mediastinal and right hilar adenopathy, compatible with steven metastatic disease, compression of the right middle lobe bronchus by right hilar adenopathy with associated volume loss of the right middle lobe, the patient had onset 11/20/2020 right neck mass biopsy, cytology was positive for malignant cells, poorly differentiated adenocarcinoma, the flow cytometry had revealed monotypic CD5 positive, CD23 positive B-cell population, with 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 The patient has a newly diagnosed non small cell lung cancer, adenocarcinoma with bulky mediastinal and right hilar adenopathy, metastatic disease to the right upper neck, he has subcentimeter nodules in the left lower lobe, discussed with the patient and his son diagnosis and prognosis, recommended to complete the staging work-up.      PET scan 8/13/2022, revealing FDG avid right cervical and supraclavicular adenopathy, soft tissue mass in the right lung, additional smaller nodules consistent with metastasis, mediastinal and right hilar lymphadenopathy, lymphadenopathy, right adrenal and liver hypermetabolic metastasis, brain MRI is negative for metastatic disease. Non-small cell lung cancer, metastatic to the lymph node in the right neck, liver and adrenal gland, the uptake in the lymph nodes in the bones      PD-L1 expression is 90%, he is a candidate for single agent Keytruda     08/23/2022, the patient was started on single agent Keytruda  Patient was unable to take care of himself later that evening. Brought by EMS to the hospital as he was minimally responsive. On arrival patient was hypotensive at 88/52 and hypoxic at 70% on room air. CTA chest revealed mild to moderate cardiomegaly with multiple enlarged upper mediastinum lymph nodes as well as the known 2.7 cm mass in the right lower lobe. Review of Systems;  CONSTITUTIONAL: No fever, chills. Fatigue  ENMT: Eyes: No diplopia; Nose: No epistaxis. Mouth: No sore throat. RESPIRATORY: No hemoptysis. + shortness of breath  CARDIOVASCULAR: No chest pain, palpitations. GASTROINTESTINAL: No nausea/vomiting, abdominal pain  GENITOURINARY: No dysuria, urinary frequency, hematuria. NEURO: No syncope, presyncope, headache. Remainder:  ROS NEGATIVE    Past Medical History:      Diagnosis Date    Cancer ([facility] [address] 2022    lung, lymphoma    Hyperlipidemia     Hypertension     Hypothyroidism      Past Surgical History:      Procedure Laterality Date    CHOLECYSTECTOMY      CORONARY ANGIOPLASTY WITH STENT PLACEMENT  2008    TONSILLECTOMY       Family History:  Family History   Problem Relation Age of Onset    Stroke Father      Medications:  Reviewed and reconciled. Social History:  Social History     Socioeconomic History    Marital status:       Spouse name: Not on file    Number of children: Not on file    Years of education: Not on file    Highest education level: Not on file   Occupational History    Not on file   Tobacco Use    Smoking status: Former    Smokeless tobacco: Never    Tobacco comments:     Pt states he quit 40-50 years enlarged necrotic lymph nodes on the right side of the neck, possible mediastinal and right hilar adenopathy, he had a chest CT scan done on 7/8/2022, revealing right lower lobe nodule, measuring up to 2.5 cm, highly suspicious for primary neoplasm, additional subcentimeter nodule in the left lower lobe, nonspecific, metastatic disease is not excluded, bulky mediastinal and right hilar adenopathy, compatible with steven metastatic disease, compression of the right middle lobe bronchus by right hilar adenopathy with associated volume loss of the right middle lobe, the patient had onset 11/20/2020 right neck mass biopsy, cytology was positive for malignant cells, poorly differentiated adenocarcinoma, the flow cytometry had revealed monotypic CD5 positive, CD23 positive B-cell population, with 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 The patient has a newly diagnosed non small cell lung cancer, adenocarcinoma with bulky mediastinal and right hilar adenopathy, metastatic disease to the right upper neck, he has subcentimeter nodules in the left lower lobe, discussed with the patient and his son diagnosis and prognosis, recommended to complete the staging work-up. PET scan 8/13/2022, revealing FDG avid right cervical and supraclavicular adenopathy, soft tissue mass in the right lung, additional smaller nodules consistent with metastasis, mediastinal and right hilar lymphadenopathy, lymphadenopathy, right adrenal and liver hypermetabolic metastasis, brain MRI is negative for metastatic disease. Non-small cell lung cancer, metastatic to the lymph node in the right neck, liver and adrenal gland, the uptake in the lymph nodes in the bones      PD-L1 expression is 90%, he is a candidate for single agent Keytruda     08/23/2022, the patient was started on single agent Keytruda  Patient was unable to take care of himself later that evening.   Brought by EMS to the hospital as he was minimally responsive. On arrival patient was hypotensive at 88/52 and hypoxic at 70% on room air. CTA chest revealed mild to moderate cardiomegaly with multiple enlarged upper mediastinum lymph nodes as well as the known 2.7 cm mass in the right lower lobe. On DuoNebs. Currently requiring 4 L oxygen. Check respiratory panel  Non-STEMI/volume overload with new onset a flutter  Cardiology consulted; not on any OAC  2D-ECHO noted LV Ejection fraction is visually estimated at 60%. Mild left ventricular concentric hypertrophy noted.    No significant valve abnormality   MEI likely from dehydration on IV fluid normal saline   Continue to monitor BMP  We will continue to follow    Thank you for allowing us to participate in the care of Mr. Arla Cheadle, MD   8/24/2022

## 2022-08-24 NOTE — PROGRESS NOTES
This patient is on medication that requires renal, weight, and/or indication dose adjustment. Date Body Weight IBW  Adjusted BW SCr  CrCl Dialysis status   8/24/2022 186 lb (84.4 kg) Ideal body weight: 70.7 kg (155 lb 13.8 oz)  Adjusted ideal body weight: 76.2 kg (167 lb 14.7 oz) Serum creatinine: 2.2 mg/dL (H) 08/23/22 2236  Estimated creatinine clearance: 25 mL/min (A) N/a       Pharmacy has dose-adjusted the following medication(s):    Date Previous Order Adjusted Order   8/24/2022 Lovenox 40mg daily Lovenox 30mg daily       These changes were made per protocol according to the [facility] Clinical Guidance for Pharmacists. *Please note this dose may need readjusted if patient's condition changes. Please contact pharmacy with any questions regarding these changes.     Akash Kenyon [facility]  8/24/2022  2:04 AM

## 2022-08-24 NOTE — PROGRESS NOTES
Inpatient Medical Oncology Progress Note    Subjective:  SOB improved. Fatigue    Objective:  BP (!) 91/50   Pulse (!) 123   Temp (!) 96.6 °F (35.9 °C)   Resp 18   Ht 5' 9\" (1.753 m)   Wt 186 lb (84.4 kg)   SpO2 94%   BMI 27.47 kg/m²   GENERAL: Alert, oriented x 3, tired  HEENT: PERRLA; EOMI. Oropharynx clear. NECK: Supple. Without lymphadenopathy. LUNGS: scattered wheezing sierra  CARDIOVASCULAR: tachycardic  ABDOMEN: Soft. Non-tender, non-distended. EXTREMITIES: Without clubbing or cyanosis or edema. NEUROLOGIC: No focal deficits.    ECOG PS 1-2    Diagnostics:  Lab Results   Component Value Date    WBC 12.9 (H) 08/24/2022    HGB 11.8 (L) 08/24/2022    HCT 37.5 08/24/2022    MCV 84.3 08/24/2022     08/24/2022     Lab Results   Component Value Date     08/24/2022    K 4.4 08/24/2022    CL 99 08/24/2022    CO2 28 08/24/2022    BUN 51 (H) 08/24/2022    CREATININE 2.1 (H) 08/24/2022    GLUCOSE 103 (H) 08/24/2022    CALCIUM 8.2 (L) 08/24/2022    PROT 6.3 (L) 08/24/2022    LABALBU 3.2 (L) 08/24/2022    BILITOT 0.9 08/24/2022    ALKPHOS 117 08/24/2022     (H) 08/24/2022     (H) 08/24/2022    LABGLOM 30 08/24/2022    GFRAA 36 08/24/2022     Impression/Plan:  82-year-old gentleman, with a past medical history significant for hypertension, hyperlipidemia and hypothyroidism, who was found to have a right neck mass when he had a work-up done for right hand numbness, he had on 6/28/2022 and neck CT scan done, revealing enlarged necrotic lymph nodes on the right side of the neck, possible mediastinal and right hilar adenopathy, he had a chest CT scan done on 7/8/2022, revealing right lower lobe nodule, measuring up to 2.5 cm, highly suspicious for primary neoplasm, additional subcentimeter nodule in the left lower lobe, nonspecific, metastatic disease is not excluded, bulky mediastinal and right hilar adenopathy, compatible with steven metastatic disease, compression of the right middle lobe bronchus by right hilar adenopathy with associated volume loss of the right middle lobe, the patient had onset 11/20/2020 right neck mass biopsy, cytology was positive for malignant cells, poorly differentiated adenocarcinoma, the flow cytometry had revealed monotypic CD5 positive, CD23 positive B-cell population, with 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 The patient has a newly diagnosed non small cell lung cancer, adenocarcinoma with bulky mediastinal and right hilar adenopathy, metastatic disease to the right upper neck, he has subcentimeter nodules in the left lower lobe, discussed with the patient and his son diagnosis and prognosis, recommended to complete the staging work-up. PET scan 8/13/2022, revealing FDG avid right cervical and supraclavicular adenopathy, soft tissue mass in the right lung, additional smaller nodules consistent with metastasis, mediastinal and right hilar lymphadenopathy, lymphadenopathy, right adrenal and liver hypermetabolic metastasis, brain MRI is negative for metastatic disease. Non-small cell lung cancer, metastatic to the lymph node in the right neck, liver and adrenal gland, the uptake in the lymph nodes in the bones     PD-L1 expression is 90%, he is a candidate for single agent Keytruda    08/23/2022, the patient was started on single agent Keytruda  Patient was unable to take care of himself later that evening. Brought by EMS to the hospital as he was minimally responsive. On arrival patient was hypotensive at 88/52 and hypoxic at 70% on room air. CTA chest revealed mild to moderate cardiomegaly with multiple enlarged upper mediastinum lymph nodes as well as the known 2.7 cm mass in the right lower lobe. On DuoNebs. Currently requiring 4 L oxygen.   Check respiratory panel  Non-STEMI/volume overload with new onset a flutter  Cardiology consulted; not on any OAC  2D-ECHO noted LV Ejection fraction is visually estimated at 60%. Mild left ventricular concentric hypertrophy noted.    No significant valve abnormality   MEI likely from dehydration on IV fluid normal saline   Continue to monitor BMP  We will continue to follow    08/24/2022  Cheryle Lust, MD

## 2022-08-25 NOTE — PROGRESS NOTES
Occupational Therapy  OCCUPATIONAL THERAPY INITIAL EVALUATION     [address] [address] [address], [address] [place] [address]                                                Patient Name: Latosha Maciel  MRN: [[id number]]  : [id number]  Room: [address] [place] [address] #[id number]    Referring Provider: GREG Valdes CNP  Specific Provider Orders/Date: OT eval and treat 22     Diagnosis: Elevated LFTs [R79.89]  Acute on chronic respiratory failure with hypoxia ([facility] [address] [J96.21]   Pt admitted to hospital on 22 for fatigue, SOB following treatment for newly diagnosed lung CA w/ mets    Pertinent Medical History:  has a past medical history of Cancer ([facility] [address] Hyperlipidemia, Hypertension, and Hypothyroidism.        Precautions:  Fall Risk, O2    Assessment of current deficits    [x] Functional mobility  [x]ADLs  [x] Strength               [x]Cognition    [x] Functional transfers   [x] IADLs         [x] Safety Awareness   [x]Endurance    [] Fine Coordination              [x] Balance      [] Vision/perception   []Sensation     []Gross Motor Coordination  [] ROM  [] Delirium                   [] Motor Control     OT PLAN OF CARE   OT POC based on physician orders, patient diagnosis and results of clinical assessment    Frequency/Duration 1-3 days/wk for 2 weeks PRN   Specific OT Treatment Interventions to include:   * Instruction/training on adapted ADL techniques and AE recommendations to increase functional independence within precautions       * Training on energy conservation strategies, correct breathing pattern and techniques to improve independence/tolerance for self-care routine  * Functional transfer/mobility training/DME recommendations for increased independence, safety, and fall prevention  * Patient/Family education to increase follow through with safety techniques and functional independence  * Recommendation of environmental modifications for increased safety with functional transfers/mobility and ADLs  * Cognitive retraining/development of therapeutic activities to improve problem solving, judgement, memory, and attention for increased safety/participation in ADL/IADL tasks  * Therapeutic exercise to improve motor endurance, ROM, and functional strength for ADLs/functional transfers  * Therapeutic activities to facilitate/challenge dynamic balance, stand tolerance for increased safety and independence with ADLs  * Therapeutic activities to facilitate gross/fine motor skills for increased independence with ADLs    Recommended Adaptive Equipment: TBD     Home Living: Pt lives alone in 1 floor home. 1 [address], 1 handrail  2 sons live close by    [address] setup: pt sponge bathes    Equipment owned: cane, walker    Prior Level of Function: independent/mod I with ADLs ,  independent/mod I with IADLs; ambulated w/ cane vs walker   Driving: yes    Pain Level: Pt c/o 6-7/10 L shoulder pain this session ; reinforced management strategies    Cognition: A&O: 3/4; Follows 1 step directions  Delayed processing speed.     Memory:  fair +   Sequencing:  fair    Problem solving:  fair    Judgement/safety:  fair      Functional Assessment:  AM-PAC Daily Activity Raw Score: 15/24   Initial Eval Status  Date: 8/25/22 Treatment Status  Date: STGs = LTGs  Time frame: 10-14 days   Feeding Supervision   Modified Tenino    Grooming Minimal Assist   Modified Ten[place]    UB Dressing Minimal Assist   Modified [place]    LB Dressing Maximal Assist   Stand by Assist    Bathing Moderate Assist  Stand by Assist    Toileting Maximal Assist   Including hygiene  Incontinent of stool  Stand by Assist    Bed Mobility  Supine to sit: Moderate Assist   Sit to supine: NT  Supine to sit: Supervision   Sit to supine: Supervision    Functional Transfers Sit><stand EOB: Moderate Assist   Sit>stand chair: Max A  Stand by Assist    Functional Mobility Moderate Assist w/ w/w  ~3 steps from EOB>chair  Assist for w/w management  Stand by Assist    Balance Sitting:     Static:  SBA    Dynamic:Min A  Standing: Mod A w/ w/w     Activity Tolerance Fair  Good   Visual/  Perceptual Glasses: yes                  Hand Dominance R   AROM (PROM) Strength Additional Info:    RUE  WFL 3+/5 good  and wfl FMC/dexterity noted during ADL tasks       LUE WFL Deferred d/t L UE pain good  and wfl FMC/dexterity noted during ADL tasks       Hearing: WFL   Sensation:  No c/o numbness or tingling   Tone: WFL   Edema: none noted    Comments: Upon arrival patient lying in bed. Therapist educated pt on role of OT. At end of session, patient seated in chair (RN aware/approved) with call light and phone within reach, all lines and tubes intact. Overall patient demonstrated decreased independence and safety during completion of ADL/functional transfer/mobility tasks. Pt would benefit from continued skilled OT to increase safety and independence with completion of ADL/IADL tasks for functional independence and quality of life. Treatment: OT treatment provided this date includes: Facilitation of bed mobility (education/cues for body mechanics, sequencing and safety), unsupported sitting balance (addressing posture, weight shifting, dynamic reaching to prep for ADL's), functional transfers (various surfaces w/ education/cues for safety/hand placement), standing tolerance tasks (addressing posture, balance and activity tolerance while incorporating light functional reaching impacting ADLs and functional activity) and steps from EOB>chair with w/w (w/ education/cuing on posture, w/w management, sequencing, attention and safety). Therapist facilitated self-care retraining: toileting task and seated grooming tasks while educating/cuing pt on modified techniques, posture, safety and energy conservation techniques. Skilled monitoring of HR, O2 sats and pts response to treatment.    Pt on 2L O2 via nasal cannula. Unable to retreive O2 reading - multiple devices, multiples attempts  Post transfers (seated in chair): TJ=863/63, 80 bpm     Rehab Potential: Good for established goals     Patient / Family Goal: not stated      Patient and/or family were instructed on functional diagnosis, prognosis/goals and OT plan of care. Demonstrated fair understanding. Eval Complexity: Low    Time In: 08:36  Time Out: 09:12  Total Treatment Time: 13 minutes    Min Units   OT Eval Low 97165  X  1   OT Eval Medium 27082      OT Eval High 30962      OT Re-Eval [id number]       Therapeutic Ex 98512       Therapeutic Activities 35878  10  1   ADL/Self Care 97805  3 0    Orthotic Management 63372       Manual [id number]     Neuro Re-Ed 17845       Non-Billable Time          Evaluation Time additionally includes thorough review of current medical information, gathering information on past medical history/social history and prior level of function, interpretation of standardized testing/informal observation of tasks, assessment of data and development of plan of care and goals.         Hailey OTR/L #[id number]

## 2022-08-25 NOTE — TELEPHONE ENCOUNTER
Spoke with Marylee Judith, patient's son and informed him that patient's MRI would be done as outpatient and when patient is discharged I can help arrange this to be rescheduled.

## 2022-08-25 NOTE — PROGRESS NOTES
[facility]                                Progress note    Subjective:     Up in the chair on assessment  Requiring less oxygen today 2L NC  Resting comfortably no apparent acute distress-improved    Objective:    /61   Pulse 84   Temp 97.2 °F (36.2 °C) (Temporal)   Resp 18   Ht 5' 9\" (1.753 m)   Wt 196 lb 8 oz (89.1 kg)   SpO2 95%   BMI 29.02 kg/m²     In: 1440 [P.O.:1440]  Out: 400   In: 1440   Out: 400 [Urine:400]    General appearance: NAD, conversant, ill-appearing but better than from . ADMISSION    HEENT: AT/NC, MMM  Neck: FROM, supple  Lungs: diminished, 2L NC  CV: RRR, no MRGs  Vasc: Radial pulses 2+  Abdomen: Soft, non-tender; no masses or HSM  Extremities: No peripheral edema or digital cyanosis  Skin: no rash, lesions or ulcers  Psych: Alert and oriented to person, place and time  Neuro: Alert and interactive     Recent Labs     08/23/22 2236 08/24/22  0450 08/25/22  0539   WBC 15.4* 12.9* 10.4   HGB 13.2 11.8* 11.3*   HCT 42.8 37.5 36.6*    236 234       Recent Labs     08/23/22 2236 08/24/22  0450 08/25/22  0539    141 138   K 4.7 4.4 4.3   CL 99 99 101   CO2 26 28 30*   BUN 48* 51* 41*   CREATININE 2.2* 2.1* 1.6*   CALCIUM 8.7 8.2* 7.7*       Assessment:    Principal Problem:    Acute on chronic respiratory failure with hypoxia (HCC)  Active Problems:    Primary adenocarcinoma of right lung (HCC)    NSTEMI (non-ST elevated myocardial infarction) (HCC)    Transaminitis    MEI (acute kidney injury) ([facility] [address]    Essential hypertension  Resolved Problems:    * No resolved hospital problems.  *      Plan:  Patient is an 53-year-old male w/ hx of primary non-small cell lung adenocarcinoma of right lung with mets to the right upper neck who is admitted to [facility] for    Acute respiratory failure with hypoxia  -Monitor labs  -Procalcitonin 0.40  -Check respiratory panel > negative   -DuoNebs as needed  -Currently requiring 2 L O2  -Patient recently initiated on Keytruda 8/23  -Appreciate oncology input    NSTEMI/volume overload with new onset of Aflutter   -Monitor labs, patient not likely candidate for intervention/PCI due to multiple comorbidities including lung cancer with metastatic disease  -Serial troponin [id number]  -proBNP 5,104  -Echocardiogram > 60% EF. Mild LVH  -Cardiology consulted  -Started on Eliquis yesterday by cardio      MEI-likely from dehydration, failure to thrive  -Monitor labs  -48/2.2 > 51/2.1 > 41/1.6 today   -Baseline creatinine 1.3  -Avoid nephrotoxins  -IVF NS @ 125, can slow down today to 75 cc an hour     Transaminates-likely from liver mets  -Monitor labs  -ALT 15 > 384 > improving today 208  -AST 23 > 395 > improving today 92  -Total bili 1.3 > 0.8 today   -Denies any abdominal pain  -Hold statin    Palliative care following for symptom management-appreciate heme    DVT Prophylaxis Eliquis   PT/OT  Discharge planning       GREG Guevara CNP  3:04 PM  8/25/2022     Above note edited to reflect my thoughts     I personally saw, examined and provided care for the patient. Radiographs, labs and medication list were reviewed by me independently. The case was discussed in detail and plans for care were established. Review of CESAR Guevara   , documentation was conducted and revisions were made as appropriate directly by me. I agree with the above documented exam, problem list, and plan of care.      Army Fabrizio MD  5:03 PM  8/25/2022

## 2022-08-25 NOTE — PROGRESS NOTES
[facility] CARDIOLOGY PROGRESS NOTE  [facility]        Subjective: Seeing patient who was admitted with mental status change, weakness, troponins elevated. Recently diagnosed lung cancer,    Echocardiogram done this admission I reviewed with him showed normal LVEF 60% mild concentric LVH, no significant valve abnormality. No pericardial effusion. Patient looks more comfortable today, more alert and appropriately interactive, conversant sitting up in a chair at bedside. Reports she is feeling better. Objective: Medications lab chart and telemetry all reviewed. Patient Vitals for the past 24 hrs:   BP Temp Temp src Pulse Resp SpO2 Weight   08/25/22 0814 103/61 97.2 °F (36.2 °C) Temporal 84 18 95 % --   08/25/22 0600 -- -- -- -- -- -- 196 lb 8 oz (89.1 kg)   08/25/22 0140 (!) 101/53 97 °F (36.1 °C) Temporal 77 18 94 % --   08/24/22 2114 (!) 112/52 97.3 °F (36.3 °C) Temporal 77 18 96 % --   08/24/22 1715 (!) 90/59 97.4 °F (36.3 °C) Oral (!) 121 16 -- --   08/24/22 1512 101/63 -- -- (!) 127 -- -- --         Intake/Output Summary (Last 24 hours) at 8/25/2022 1415  Last data filed at 8/25/2022 0859  Gross per 24 hour   Intake 960 ml   Output 400 ml   Net 560 ml       Wt Readings from Last 3 Encounters:   08/25/22 196 lb 8 oz (89.1 kg)   08/23/22 186 lb 8 oz (84.6 kg)   08/16/22 190 lb (86.2 kg)       Telemetry: Normal sinus rhythm heart rate in the 70s.     Current meds: Scheduled Meds:   senna  1 tablet Oral Nightly    aspirin  81 mg Oral Daily    levothyroxine  75 mcg Oral Daily    pravastatin  40 mg Oral Daily    sertraline  50 mg Oral Daily    sodium chloride flush  5-40 mL IntraVENous 2 times per day    metoprolol succinate  25 mg Oral BID    apixaban  5 mg Oral BID     Continuous Infusions:   sodium chloride 125 mL/hr at 08/25/22 9640    sodium chloride       PRN Meds:.traMADol, polyethylene glycol, sodium chloride flush, sodium chloride, perflutren lipid microspheres    Allergies: Patient has no known allergies. Labs:   Recent Labs     08/23/22 2236 08/24/22 0450 08/25/22  0539   WBC 15.4* 12.9* 10.4   HGB 13.2 11.8* 11.3*   HCT 42.8 37.5 36.6*   MCV 87.9 84.3 88.4    236 234       Labs:   Recent Labs     08/23/22 2236 08/24/22 0450 08/25/22  0539    141 138   K 4.7 4.4 4.3   CL 99 99 101   CO2 26 28 30*   PHOS 6.0*  --   --    BUN 48* 51* 41*   CREATININE 2.2* 2.1* 1.6*       Labs: No results for input(s): CKTOTAL, CKMB, CKMBINDEX, TROPONINI in the last 72 hours. Labs: No results for input(s): BNP in the last 72 hours. Labs:   Recent Labs     08/24/22 0450   CHOL 106   HDL 38   TRIG 79       Labs:   Recent Labs     08/23/22 2236 08/24/22  0450 08/25/22  0539   PROT 7.2 6.3* 5.6*   INR 1.4  --   --        Review of systems: No reported significant weight gain or weight loss. no dysuria or frequency, no dizziness, falls or trauma, no change in bowel or bladder habits, no hematochezia, hemoptysis or hematuria. No fevers, chills, nausea or vomiting reported. No significant wheezing or sputum production. No headache or visual changes. The remainder of the 10 review of systems otherwise negative. Exam      General: Patient comfortable in no distress and currently denies any chest pain. HEENT: Face symmetrical and no apparent cranial nerve deficit. Neck: No jugular venous distention, carotid bruit or thyromegaly. Lungs: Clear bilaterally without rales, wheezes or dullness. Cardiac: Regular rate and rhythm, no S3, S4, no rub or gallop. Abdomen: No rebound or guarding, no hepatosplenomegaly. Extremities: Without significant clubbing , cyanosis, or edema. Neuro:  No focal motor or sensory deficit apparent. Skin: No petechiae, no significant bruising. Assessment: See plan below        Plan: #1 non-ST elevation myocardial infarction in the setting of hypoxia, shortness of breath, mental status change and has no dyspnea.   With prior RAMSEY to the RCA May 2009. Medical management at this time. No reported chest pain. #2 metastatic lung cancer will likely dictate overall prognosis. Would appropriately conservatively manage known CAD. #3 AF RVR yesterday but on admission was in normal sinus rhythm. Given IV digoxin and has converted back to normal sinus rhythm heart rate in the 70s. #4 chronic anticoagulation starting Eliquis 5 mg twice a day. Start aspirin 81 mg daily with known underlying CAD and question of non-STEMI. #5 hypotension improved and I switched over to a lower dose saline 125 cc an hour for an additional 3 L. #6 acute renal insufficiency creatinine improved from 2.22 days ago down to 1.6 today, BUN 41.   suspect he was somewhat dehydrated on presentation and admission. Continue supportive measures.         Electronically signed by Filiberto Aguirre MD on 8/25/2022 at 2:15 PM

## 2022-08-25 NOTE — PROGRESS NOTES
Per Dr. Gladys Pizano patients MRI is ordered as an open MRI and advised the Navigator would contact son.

## 2022-08-25 NOTE — PROGRESS NOTES
Inpatient Medical Oncology Progress Note    Subjective:  No fever, chills. SOB improved. Medications:  Reviewed and reconciled. Physical Exam:  /61   Pulse 84   Temp 97.2 °F (36.2 °C) (Temporal)   Resp 18   Ht 5' 9\" (1.753 m)   Wt 196 lb 8 oz (89.1 kg)   SpO2 95%   BMI 29.02 kg/m²   GENERAL: Alert, oriented x 3, tired  HEENT: PERRLA; EOMI. Oropharynx clear. NECK: Supple. Without lymphadenopathy. LUNGS: scattered wheezing sierra  CARDIOVASCULAR: RRR  ABDOMEN: Soft. Non-tender, non-distended. EXTREMITIES: Without clubbing, cyanosis, or edema. NEUROLOGIC: No focal deficits.    ECOG PS 2    Lab Results   Component Value Date    WBC 10.4 08/25/2022    HGB 11.3 (L) 08/25/2022    HCT 36.6 (L) 08/25/2022    MCV 88.4 08/25/2022     08/25/2022     Lab Results   Component Value Date     08/25/2022    K 4.3 08/25/2022     08/25/2022    CO2 30 (H) 08/25/2022    BUN 41 (H) 08/25/2022    CREATININE 1.6 (H) 08/25/2022    GLUCOSE 88 08/25/2022    CALCIUM 7.7 (L) 08/25/2022    PROT 5.6 (L) 08/25/2022    LABALBU 2.9 (L) 08/25/2022    BILITOT 0.8 08/25/2022    ALKPHOS 105 08/25/2022    AST 92 (H) 08/25/2022     (H) 08/25/2022    LABGLOM 41 08/25/2022    GFRAA 50 08/25/2022     Impression/Plan:  82-year-old gentleman, with a past medical history significant for hypertension, hyperlipidemia and hypothyroidism, who was found to have a right neck mass when he had a work-up done for right hand numbness, he had on 6/28/2022 and neck CT scan done, revealing enlarged necrotic lymph nodes on the right side of the neck, possible mediastinal and right hilar adenopathy, he had a chest CT scan done on 7/8/2022, revealing right lower lobe nodule, measuring up to 2.5 cm, highly suspicious for primary neoplasm, additional subcentimeter nodule in the left lower lobe, nonspecific, metastatic disease is not excluded, bulky mediastinal and right hilar adenopathy, compatible with steven metastatic disease, compression of the right middle lobe bronchus by right hilar adenopathy with associated volume loss of the right middle lobe, the patient had onset 11/20/2020 right neck mass biopsy, cytology was positive for malignant cells, poorly differentiated adenocarcinoma, the flow cytometry had revealed monotypic CD5 positive, CD23 positive B-cell population, with 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 The patient has a newly diagnosed non small cell lung cancer, adenocarcinoma with bulky mediastinal and right hilar adenopathy, metastatic disease to the right upper neck, he has subcentimeter nodules in the left lower lobe, discussed with the patient and his son diagnosis and prognosis, recommended to complete the staging work-up. PET scan 8/13/2022, revealing FDG avid right cervical and supraclavicular adenopathy, soft tissue mass in the right lung, additional smaller nodules consistent with metastasis, mediastinal and right hilar lymphadenopathy, lymphadenopathy, right adrenal and liver hypermetabolic metastasis, brain MRI is negative for metastatic disease. Non-small cell lung cancer, metastatic to the lymph node in the right neck, liver and adrenal gland, the uptake in the lymph nodes in the bones      PD-L1 expression is 90%, candidate for single agent Keytruda     08/23/2022, the patient was started on single agent Keytruda  Patient was unable to take care of himself later that evening. Brought by EMS to the hospital as he was minimally responsive. On arrival patient was hypotensive at 88/52 and hypoxic at 70% on room air. CTA chest revealed mild to moderate cardiomegaly with multiple enlarged upper mediastinum lymph nodes as well as the known 2.7 cm mass in the right lower lobe. On DuoNebs. O2 NC  Respiratory panel negative  Chest x-ray 8/24/2022 no acute process imaging reviewed.   Non-STEMI/volume overload with new onset a flutter  Cardiology consulted;  2D-ECHO noted LV Ejection fraction is visually estimated at 60%. Mild left ventricular concentric hypertrophy noted. No significant valve abnormality   MEI likely from dehydration on IVF   Creat improved to 1.6 today 08/25/2022. Labs reviewed.   Continue to monitor BMP  Palliative care team for symptom management  We will continue to follow    Bryant Le MD   8/25/2022

## 2022-08-25 NOTE — PLAN OF CARE
Problem: Discharge Planning  Goal: Discharge to home or other facility with appropriate resources  Outcome: Progressing  Flowsheets (Taken 8/24/2022 2115 by Edy Cantu, RN)  Discharge to home or other facility with appropriate resources: Identify barriers to discharge with patient and caregiver     Problem: ABCDS Injury Assessment  Goal: Absence of physical injury  Outcome: Progressing  Flowsheets (Taken 8/24/2022 2115 by Edy Cantu, RN)  Absence of Physical Injury: Implement safety measures based on patient assessment

## 2022-08-25 NOTE — CONSULTS
Palliative Care Department  [phone]  Palliative Care Initial Consult  Provider GREG Kim - CNP      PATIENT: Clay Esquivel  : 1935  MRN: [id number]  ADMISSION DATE: 2022 10:04 PM  Referring Provider: Blanca Lindsey MD    Palliative Medicine was consulted on hospital day 1 for assistance with Symptom management     HPI:     Maine Reese is a 82 y.o. y/o male with a history of non-small cell lung cancer and CLL/SLL, HLD, HTN, hypothyroidism diagnosed 2022, known to Dr. Evelin Potts,  who presented to [facility]) on 2022 with fatigue and shortness of breath. Patient reported of progressive fatigue and shortness of breath, after arriving home from receiving Keytruda treatment earlier in the day. At the emergency room patient was found to be hypotensive with blood pressure 88/52, and found hypoxic around 70% on room air. CT of the pulmonary reveals mild to moderate cardiomegaly with with multiple mediastinal lymph nodes, 2.7 cm mass in the right lower lobe. Significant laboratory findings indicate BUN/creatinine 48/2.2, proBNP 5100, worsening L Fts, leukocytosis, Pro-Dean 0.40. ASSESSMENT/PLAN:     Pertinent Hospital Diagnoses     Acute on chronic respiratory failure  MEI  Non-small cell lung cancer and CLL/SLL:              -Known to Dr. Evelin Potts              -Diagnosed 2022              -On Keytruda    Pain:   -Decrease tramadol 50 mg to 25 mg every 6 hours as needed   -Discontinue fentanyl 50 mcg IV every 2 hours as needed        Palliative Care Encounter / Counseling Regarding Goals of Care  Please see detailed goals of care discussion as below  At this time, Clay Esquivel, Does have capacity for medical decision-making.   Capacity is time limited and situation/question specific  During encounter Eugenio Yu and patient was surrogate medical decision-maker  Outcome of goals of care meeting:  Continue with current medical treatment  Following for symptom Guarded    OBJECTIVE:     /61   Pulse 84   Temp 97.2 °F (36.2 °C) (Temporal)   Resp 18   Ht 5' 9\" (1.753 m)   Wt 196 lb 8 oz (89.1 kg)   SpO2 95%   BMI 29.02 kg/m²     Physical Examination:  Gen: elderly, thin, NAD, awake, alert   HEENT: normocephalic, atraumatic, PERRL, EOMI,   Neck: trachea midline, no JVD  Lungs: respirations easy and not labored,   Heart: regular rate and rhythm, distant heart tones,   Abdomen: normoactive bowel sounds, soft, non-tender  Extremities: no clubbing, cyanosis or edema, moving all extremities    Skin: warm, dry without rashes, lesions, bruising  Neuro: awake, alert, oriented x 3, follows commands, no gross neurologic deficit    Objective data reviewed: labs, images, records, medication use, vitals, and chart    Time/Communication  Greater than 50% of time spent, total 50 minutes in counseling and coordination of care at the bedside regarding symptom management. Thank you for allowing Palliative Medicine to participate in the care of Galilea Mustafa. Note: This report was completed using computerSendtoNews voiced recognition software. Every effort has been made to ensure accuracy; however, inadvertent computerized transcription errors may be present.

## 2022-08-25 NOTE — CARE COORDINATION
RN reports [facility] made to [facility]. [facility] only has a semi private with a MR/DD resident who is chatty but out of the room a lot. Spoke wit Son Eugenio Yu by phone. Pt would not do will with a catty roommate. Eugenio Yu said to try [facility]. Referral given to liaison [address] [place] [address]. Discharge plan is to [facility] when medically stable. SW.CM following for discharge needs.    Tamy Bone, L.S.W.  [phone]

## 2022-08-25 NOTE — PROGRESS NOTES
Sent Dr. Artur Martínez a message in regards to MRI. Per son, MRI of pelvis was to be ordered by oncology.

## 2022-08-25 NOTE — PROGRESS NOTES
Physical Therapy    Initial Assessment     Name: Vijaya Gomez  : 1935  MRN: [id number]      Date of Service: 2022    Evaluating PT: Monica Nichols, PT, DPT [id number]      Room #:  8565/1297-D  Diagnosis:  Elevated LFTs [R79.89]  Acute on chronic respiratory failure with hypoxia ([facility] [address] [J96.21]  PMHx/PSHx:  HLD, HTN, Hypothyroidism, CA  Precautions:  Fall Risk, O2, Alarm    SUBJECTIVE:    Pt lives alone in a 1 story house with 1 stair(s) and 1 rail(s) to enter. Pt ambulated most of the time with a cane and occasionally [employer].     OBJECTIVE:   Initial Evaluation  Date: 22 Treatment Date: Short Term/ Long Term   Goals   AM-PAC 6 Clicks 45/35     Was pt agreeable to Eval/treatment? Yes      Does pt have pain? 7/10 L shoulder pain     Bed Mobility  Rolling: NT  Supine to sit: Mod A  Sit to supine: NT  Scooting: Mod A  Rolling: Independent   Supine to sit: SBA  Sit to supine: SBA  Scooting: SBA   Transfers Sit to stand: Mod A from EOB, Max A from chair  Stand to sit: Mod A  Stand pivot: Mod A with [employer]  Sit to stand: SBA  Stand to sit: SBA  Stand pivot: SBA with [employer]   Ambulation   NT  50 feet with [employer] with Min A   Stair negotiation: ascended and descended NT  1 step(s) with 1 rail(s) with Min A   ROM BUE: See OT note  BLE: WFL     Strength BUE: See OT note  BLE: grossly 4/5     Balance Sitting EOB: Min A  Dynamic Standing: Mod A with [employer]  Sitting EOB: Supervision   Dynamic Standing: Min A with WW     Pt is A & O x: 4 to person, place, time and situation. Pt very delayed and foggy. Sensation: pt denies numbness or tingling. Edema: Unremarkable. Patient education  Pt educated on PT role in acute care.     Patient response to education:   Pt verbalized understanding Pt demonstrated skill Pt requires further education in this area   Yes  NA Reinforce      ASSESSMENT:    Conditions Requiring Skilled Therapeutic Intervention:    [x]Decreased strength     []Decreased ROM  [x]Decreased functional mobility  [x]Decreased balance   [x]Decreased endurance   []Decreased posture  []Decreased sensation  []Decreased coordination   []Decreased vision  []Decreased safety awareness   [x]Increased pain     Comments:    Upon entry to room, pt supine in bed and agreeable to PT evaluation. Pt on 2L O2 at rest and could not get a successful O2 sat reading throughout session after multiple attempts and devices. Pt required medium assistance to move to EOB. Once sitting upright pt stated he was dizzy and 6/10 lightheadedness. Pt had flexed head and neck. Pt sat EOB for 4+ minutes. Pt complained of L shoulder pain throughout session. Pt able to maintain sitting balance with light assistance for safety. Pt completed pivot transfer with WW to chair. Pt took small slow steps and needed some help advancing [employer]. Pt sat in chair. Pt stated he felt a bit better once sitting in chair but he still felt \"like things were not clear\". Still unable to get an O2 sat reading, but pt BP was 112/63 and HR was 80 bpm. Pt completed stand from chair requiring more assistance than stand from bed. Pt was cleaned up and returned to sitting in the chair. Pt stated he felt ok sitting up and was left in chair with all needs met and call light nearby. Pt instructed to call for help when wanting to return to bed. RN aware of patient performance. Treatment:  Patient practiced and was instructed in the following treatment:    Bed mobility: verbal cues for sequencing, safety and technique. Physical assistance for supine to sit. Transfers: (x1 EOB, x1 chair). Verbal cues for hand placement and technique. Assistance needed for sit to stand from both surfaces. [employer] management and physical assistance to advance [employer] during pivot transfer. Sitting EOB 4+ minutes( tolerance to upright, strength, endurance, sitting balance)  Monitoring of vitals and symptoms throughout session    Pt's/family goals:  1.  To return to PLOF    Prognosis is Fair for reaching above PT goals. Patient and or family understand(s) diagnosis, prognosis, and plan of care. Yes. PHYSICAL THERAPY PLAN OF CARE:    PT POC is established based on physician order and patient diagnosis     Referring provider/PT Order:    Start   Ordering Provider    08/24/22 0215  PT evaluation and treat  Start:  08/24/22 0215,   End:  08/24/22 0215,   ONE TIME,   Standing Count:  1 Occurrences,   R         Mercy Sainiless, APRN - CNP      Diagnosis:  Elevated LFTs [R79.89]  Acute on chronic respiratory failure with hypoxia ([facility] [address] [J96.21]  Specific instructions for next treatment:  to increase activity. Current Treatment Recommendations:     [x] Strengthening to improve independence with functional mobility   [] ROM to improve independence with functional mobility   [x] Balance Training to improve static/dynamic balance and to reduce fall risk  [x] Endurance Training to improve activity tolerance during functional mobility   [x] Transfer Training to improve safety and independence with all functional transfers   [x] Gait Training to improve gait mechanics, endurance and assess need for appropriate assistive device  [] Stair Training in preparation for safe discharge home and/or into the community   [] Positioning to prevent skin breakdown and contractures  [x] Safety and Education Training   [x] Patient/Caregiver Education   [] HEP  [] Other     PT long term treatment goals are located in above grid    Frequency of treatments: 2-5x/week x 2-3 days. Time in  0835  Time out  0910    Total Treatment Time  15 minutes     Evaluation Time includes thorough review of current medical information, gathering information on past medical history/social history and prior level of function, completion of standardized testing/informal observation of tasks, assessment of data and education on plan of care and goals.     CPT codes:  [x] Low Complexity PT evaluation 54054  [] Moderate Complexity PT evaluation 62612  [] High Complexity PT evaluation [id number]  [] PT Re-evaluation [id number]  [] Gait training 31261 0 minutes  [] Manual therapy 98978 0 minutes  [x] Therapeutic activities 77535 15 minutes  [] Therapeutic exercises 69567 0 minutes  [] Neuromuscular reeducation 02638 0 minutes     Clemente Cueva, PT, DPT  [id number]      Trenton Dodson, SPT

## 2022-08-26 NOTE — PROGRESS NOTES
[facility] CARDIOLOGY PROGRESS NOTE  [facility]        Subjective: Seeing patient admitted with hypotension, SOB/GILLILAND, mental status change, recently diagnosed metastatic lung cancer and had started Keytruda earlier that day. Also on admission elevated troponin non-ST elevation microinfarction with known history of CAD prior drug-eluting stent to the RCA 2009. On presentation was in normal sinus rhythm but then went into atrial flutter overnight. Was tachycardic the next day. Started on IV Cardizem and beta-blocker, IV digoxin and converted back to normal sinus rhythm. Denies any recent chest pressure or chest pain. No indication of unstable angina. Objective: Medications lab chart and telemetry all reviewed. Patient Vitals for the past 24 hrs:   BP Temp Temp src Pulse SpO2 Weight   08/26/22 0930 -- -- -- -- 91 % --   08/26/22 0814 (!) 106/58 97.3 °F (36.3 °C) Temporal 83 (!) 76 % --   08/26/22 0600 -- -- -- -- -- 209 lb 1 oz (94.8 kg)   08/25/22 2345 90/60 -- -- 73 -- --   08/25/22 2013 115/67 (!) 96.6 °F (35.9 °C) Temporal 72 100 % --   08/25/22 1546 107/72 96.9 °F (36.1 °C) Temporal 71 95 % --         Intake/Output Summary (Last 24 hours) at 8/26/2022 1126  Last data filed at 8/26/2022 0641  Gross per 24 hour   Intake 240 ml   Output 725 ml   Net -485 ml       Wt Readings from Last 3 Encounters:   08/26/22 209 lb 1 oz (94.8 kg)   08/23/22 186 lb 8 oz (84.6 kg)   08/16/22 190 lb (86.2 kg)       Telemetry: Personally reviewed and shows normal sinus rhythm heart rate in the 70s and 80s.     Current meds: Scheduled Meds:   senna  1 tablet Oral Nightly    aspirin  81 mg Oral Daily    levothyroxine  75 mcg Oral Daily    [Held by provider] pravastatin  40 mg Oral Daily    sertraline  50 mg Oral Daily    sodium chloride flush  5-40 mL IntraVENous 2 times per day    metoprolol succinate  25 mg Oral BID    apixaban  5 mg Oral BID     Continuous Infusions:   sodium chloride 125 mL/hr at 08/26/22 0941     PRN Meds:.ipratropium-albuterol, traMADol, polyethylene glycol, sodium chloride flush, sodium chloride, perflutren lipid microspheres    Allergies: Patient has no known allergies. Labs:   Recent Labs     08/24/22 0450 08/25/22  0539 08/26/22  1037   WBC 12.9* 10.4 10.4   HGB 11.8* 11.3* 12.0*   HCT 37.5 36.6* 39.1   MCV 84.3 88.4 85.9    234 293       Labs:   Recent Labs     08/23/22 2236 08/24/22 0450 08/25/22  0539 08/26/22  1037    141 138 138   K 4.7 4.4 4.3 5.0   CL 99 99 101 104   CO2 26 28 30* 27   PHOS 6.0*  --   --   --    BUN 48* 51* 41* 31*   CREATININE 2.2* 2.1* 1.6* 1.3*       Labs: No results for input(s): CKTOTAL, CKMB, CKMBINDEX, TROPONINI in the last 72 hours. Labs: No results for input(s): BNP in the last 72 hours. Labs:   Recent Labs     08/24/22  0450   CHOL 106   HDL 38   TRIG 79       Labs:   Recent Labs     08/23/22 2236 08/24/22 0450 08/25/22  0539 08/26/22  1037   PROT 7.2 6.3* 5.6* 5.9*   INR 1.4  --   --   --        Review of systems: No reported significant weight gain or weight loss. no dysuria or frequency, no dizziness, falls or trauma, no change in bowel or bladder habits, no hematochezia, hemoptysis or hematuria. No fevers, chills, nausea or vomiting reported. No significant wheezing or sputum production. No headache or visual changes. The remainder of the 10 review of systems otherwise negative. Exam      General: Patient comfortable in no distress and currently denies any chest pain. HEENT: Face symmetrical and no apparent cranial nerve deficit. Neck: No jugular venous distention, carotid bruit or thyromegaly. Lungs: Clear bilaterally without rales, wheezes or dullness. Cardiac: Regular rate and rhythm, no S3, S4, no rub or gallop. Abdomen: No rebound or guarding, no hepatosplenomegaly. Extremities: Without significant clubbing , cyanosis, or edema.      Neuro:  No focal motor or sensory deficit apparent. Skin: No petechiae, no significant bruising. Assessment: See plan below        Plan: #1 NSTEMI and no indication of overt unstable angina or chest pain. Medical management at this time with known chronic CAD. Aspirin 81 mg daily, metoprolol succinate 25 mg twice a day, pravastatin 40 mg daily held with elevated LFTs at this time. Normal LVEF on echocardiogram done this admission. #2 metastatic lung cancer. Defer management to oncology. No reported hemoptysis or indication of bleeding. #3 AF RVR now back in normal sinus rhythm and continue current medications    #4 chronic anticoagulation Eliquis 5 mg twice a day and aspirin 81 mg daily with known chronic CAD and now PAF. #5 hypotension and has received IV fluid and blood pressure improved. #6 acute renal insufficiency and creatinine improved after IV fluid. Likely a little dehydrated on admission. Creatinine down to 1.3 BUN 31 today. Advance activity as tolerated. Could be discharged from cardiology viewpoint. Plan outpatient follow-up 1 month at [facility] cardiology.     Electronically signed by Amari Anna MD on 8/26/2022 at 11:26 AM

## 2022-08-26 NOTE — CARE COORDINATION
[facility] cannot accept Pt d/t chemo tx. [facility] has a private bed. Jorge Durán called back. [facility] has a private room that is available now d/t discharge. Informed Son fermin of bed open at [facility]. Dr. Gaetano Malik said Pt is not stable for discharge. Discharge Plan is to [facility] when medically stable. SW/CM to follow for discharge needs.    Kathey Bamberger, L.S.W.  [phone]

## 2022-08-26 NOTE — PROGRESS NOTES
Palliative Care Department  [phone]  Palliative Care Progress Note  Provider Thea Buckley, GREG - CNP      PATIENT: Shira Larson  : 1935  MRN: [id number]  ADMISSION DATE: 2022 10:04 PM  Referring Provider: Eleni Schmidt MD    Palliative Medicine was consulted on hospital day 2 for assistance with Symptom management     HPI:     Patricia Mac is a 82 y.o. y/o male with a history of non-small cell lung cancer and CLL/SLL, HLD, HTN, hypothyroidism diagnosed 2022, known to Dr. Cathie Fox,  who presented to [facility]) on 2022 with fatigue and shortness of breath. Patient reported of progressive fatigue and shortness of breath, after arriving home from receiving Keytruda treatment earlier in the day. At the emergency room patient was found to be hypotensive with blood pressure 88/52, and found hypoxic around 70% on room air. CT of the pulmonary reveals mild to moderate cardiomegaly with with multiple mediastinal lymph nodes, 2.7 cm mass in the right lower lobe. Significant laboratory findings indicate BUN/creatinine 48/2.2, proBNP 5100, worsening L Fts, leukocytosis, Pro-Dean 0.40. ASSESSMENT/PLAN:     Pertinent Hospital Diagnoses     Acute on chronic respiratory failure  MEI  Non-small cell lung cancer and CLL/SLL:              -Known to Dr. Cathie Fox              -Diagnosed 2022              -On Keytruda    Pain:   -Decrease tramadol 50 mg to 25 mg every 6 hours as needed   -Discontinue fentanyl 50 mcg IV every 2 hours as needed        Palliative Care Encounter / Counseling Regarding Goals of Care  Please see detailed goals of care discussion as below  At this time, Shira Larson, Does have capacity for medical decision-making.   Capacity is time limited and situation/question specific  During encounter Dolly Hope and patient was surrogate medical decision-maker  Outcome of goals of care meeting:  Continue with current medical treatment  Following for symptom management  Code status Full Code  Advanced Directives: no POA or living will in epic  Surrogate/Legal NOK:  Leonel Galarza (Child) [phone]  Mundo David (Child) [phone]    Spiritual assessment: no spiritual distress identified  Bereavement and grief: to be determined  Referrals to: none today    Thank you for the opportunity to participate in the care of Mary Anne Glover. GREG Taylor CNP  Palliative Medicine     SUBJECTIVE:     Details of Conversation:      Chart was reviewed. Saw patient at the bedside. Patient stated that are still having pain on his left arm shoulder and back 6 out of 10, he had received 1 dose of tramadol in the last 24 hours. I advised patient to ask for his analgesic when he needs it, better control of his pain. I spoke with bedside nurse. Patient is moving his bowels. We will continue to follow. Prognosis: Guarded    OBJECTIVE:     BP (!) 106/58   Pulse 83   Temp 97.3 °F (36.3 °C) (Temporal)   Resp 18   Ht 5' 9\" (1.753 m)   Wt 209 lb 1 oz (94.8 kg)   SpO2 91%   BMI 30.87 kg/m²     Physical Examination:  Gen: elderly, thin, NAD, awake, alert   HEENT: normocephalic, atraumatic, PERRL, EOMI,   Neck: trachea midline, no JVD  Lungs: respirations easy and not labored,   Heart: regular rate and rhythm, distant heart tones,   Abdomen: normoactive bowel sounds, soft, non-tender  Extremities: no clubbing, cyanosis or edema, moving all extremities    Skin: warm, dry without rashes, lesions, bruising  Neuro: awake, alert, oriented x 3, follows commands, no gross neurologic deficit    Objective data reviewed: labs, images, records, medication use, vitals, and chart    Time/Communication  Greater than 50% of time spent, total 15 minutes in counseling and coordination of care at the bedside regarding symptom management. Thank you for allowing Palliative Medicine to participate in the care of Mary Anne Glover.     Note: This report was completed using computerize voiced recognition software. Every effort has been made to ensure accuracy; however, inadvertent computerized transcription errors may be present.

## 2022-08-26 NOTE — PROGRESS NOTES
[facility]                                Progress note    Subjective: In bed on assessment, tearful. Worsening breathing this AM, elevated pro BNP  Will give IV lasix 40mg  x1      Objective:    /67   Pulse 72   Temp 97 °F (36.1 °C) (Temporal)   Resp 18   Ht 5' 9\" (1.753 m)   Wt 209 lb 1 oz (94.8 kg)   SpO2 91%   BMI 30.87 kg/m²     In: 840 [P.O.:840]  Out: 725   In: 840   Out: 725 [Urine:725]    General appearance: NAD, conversant, ill-appearing but better than from admission   HEENT: AT/NC, MMM  Neck: FROM, supple  Lungs: diminished, 3L NC  CV: RRR, no MRGs  Vasc: Radial pulses 2+  Abdomen: Soft, non-tender; no masses or HSM  Extremities: No peripheral edema or digital cyanosis  Skin: no rash, lesions or ulcers  Psych: Alert and oriented to person, place and time  Neuro: Alert and interactive     Recent Labs     08/24/22  0450 08/25/22  0539 08/26/22  1037   WBC 12.9* 10.4 10.4   HGB 11.8* 11.3* 12.0*   HCT 37.5 36.6* 39.1    234 293         Recent Labs     08/24/22  0450 08/25/22  0539 08/26/22  1037    138 138   K 4.4 4.3 5.0   CL 99 101 104   CO2 28 30* 27   BUN 51* 41* 31*   CREATININE 2.1* 1.6* 1.3*   CALCIUM 8.2* 7.7* 7.8*         Assessment:    Principal Problem:    Acute on chronic respiratory failure with hypoxia (HCC)  Active Problems:    Primary adenocarcinoma of right lung (HCC)    NSTEMI (non-ST elevated myocardial infarction) (HCC)    Transaminitis    MEI (acute kidney injury) ([facility] [address]    Essential hypertension  Resolved Problems:    * No resolved hospital problems.  *      Plan:  Patient is an 82-year-old male w/ hx of primary non-small cell lung adenocarcinoma of right lung with mets to the right upper neck who is admitted to [facility] for    Acute respiratory failure with hypoxia  -Monitor labs  -Procalcitonin 0.40  -Check respiratory panel > negative   -DuoNebs as needed  -Currently requiring 2 L O2  -Patient recently initiated on Keytruda 8/23  -Appreciate oncology input  -Stat chest x-ray ordered secondary to worsening shortness breath-increased pulmonary vascular congestion-Lasix 40 IV x1 ordered earlier, may need to repeat again today    NSTEMI/volume overload with new onset of Aflutter   -Monitor labs, patient not likely candidate for intervention/PCI due to multiple comorbidities including lung cancer with metastatic disease  -Serial troponin [id number]  -proBNP 5,104 > 20,628 today, give lasix 40mg x1   -Echocardiogram > 60% EF. Mild LVH  -Cardiology consulted  -Started on Eliquis yesterday by cardio   -     MEI-likely from dehydration, failure to thrive  -Monitor labs  -48/2.2 > 51/2.1 > 41/1.6  > 31/1.3 today   -Baseline creatinine 1.3  -Avoid nephrotoxins  -IVF NS @ 125 > discontinued secondary to worsening dyspnea     Transaminates-likely from liver mets  -Monitor labs  -ALT 15 > 384 > improving today 208 > 145  -AST 23 > 395 > improving today 92 > 48  -Total bili 1.3 > 0.8 today   -Denies any abdominal pain  -Hold statin    Palliative care following for symptom management-appreciate heme    DVT Prophylaxis Eliquis   PT/OT  Discharge planning to rehab facility         [facility], APRN - CNP  3:41 PM  8/26/2022       Above note edited to reflect my thoughts     I personally saw, examined and provided care for the patient. Radiographs, labs and medication list were reviewed by me independently. The case was discussed in detail and plans for care were established. Review of [facility], APRN-CNP   , documentation was conducted and revisions were made as appropriate directly by me. I agree with the above documented exam, problem list, and plan of care.      Osmar Veloz MD  5:44 PM  8/26/2022

## 2022-08-26 NOTE — PROGRESS NOTES
Inpatient Medical Oncology Progress Note    Subjective:  Overall feeling better, still with shortness of breath. Medications:  Reviewed and reconciled. Physical Exam:  /67   Pulse 72   Temp 97 °F (36.1 °C) (Temporal)   Resp 18   Ht 5' 9\" (1.753 m)   Wt 209 lb 1 oz (94.8 kg)   SpO2 91%   BMI 30.87 kg/m²   GENERAL: Alert, oriented x 3, tired  HEENT: PERRLA; EOMI. Oropharynx clear. NECK: Supple. Without lymphadenopathy. LUNGS: Decreased air entry bilaterally  CARDIOVASCULAR: Rhythm, normal rate  ABDOMEN: Soft. Non-tender, non-distended. EXTREMITIES: Without clubbing, cyanosis, or edema. NEUROLOGIC: No focal deficits.    ECOG PS 2    Lab Results   Component Value Date    WBC 10.4 08/26/2022    HGB 12.0 (L) 08/26/2022    HCT 39.1 08/26/2022    MCV 85.9 08/26/2022     08/26/2022     Lab Results   Component Value Date     08/26/2022    K 5.0 08/26/2022     08/26/2022    CO2 27 08/26/2022    BUN 31 (H) 08/26/2022    CREATININE 1.3 (H) 08/26/2022    GLUCOSE 104 (H) 08/26/2022    CALCIUM 7.8 (L) 08/26/2022    PROT 5.9 (L) 08/26/2022    LABALBU 3.0 (L) 08/26/2022    BILITOT 0.8 08/26/2022    ALKPHOS 114 08/26/2022    AST 48 (H) 08/26/2022     (H) 08/26/2022    LABGLOM 52 08/26/2022    GFRAA >60 08/26/2022     Impression/Plan:  68-year-old gentleman, with a past medical history significant for hypertension, hyperlipidemia and hypothyroidism, who was found to have a right neck mass when he had a work-up done for right hand numbness, he had on 6/28/2022 and neck CT scan done, revealing enlarged necrotic lymph nodes on the right side of the neck, possible mediastinal and right hilar adenopathy, he had a chest CT scan done on 7/8/2022, revealing right lower lobe nodule, measuring up to 2.5 cm, highly suspicious for primary neoplasm, additional subcentimeter nodule in the left lower lobe, nonspecific, metastatic disease is not excluded, bulky mediastinal and right hilar adenopathy, compatible with steven metastatic disease, compression of the right middle lobe bronchus by right hilar adenopathy with associated volume loss of the right middle lobe, the patient had onset 11/20/2020 right neck mass biopsy, cytology was positive for malignant cells, poorly differentiated adenocarcinoma, the flow cytometry had revealed monotypic CD5 positive, CD23 positive B-cell population, with 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 The patient has a newly diagnosed non small cell lung cancer, adenocarcinoma with bulky mediastinal and right hilar adenopathy, metastatic disease to the right upper neck, he has subcentimeter nodules in the left lower lobe, discussed with the patient and his son diagnosis and prognosis, recommended to complete the staging work-up. PET scan 8/13/2022, revealing FDG avid right cervical and supraclavicular adenopathy, soft tissue mass in the right lung, additional smaller nodules consistent with metastasis, mediastinal and right hilar lymphadenopathy, lymphadenopathy, right adrenal and liver hypermetabolic metastasis, brain MRI is negative for metastatic disease. Non-small cell lung cancer, metastatic to the lymph node in the right neck, liver and adrenal gland, the uptake in the lymph nodes in the bones      PD-L1 expression is 90%, candidate for single agent Keytruda     08/23/2022, the patient was started on single agent Keytruda  Patient was unable to take care of himself later that evening. Brought by EMS to the hospital as he was minimally responsive. On arrival patient was hypotensive at 88/52 and hypoxic at 70% on room air. CTA chest revealed mild to moderate cardiomegaly with multiple enlarged upper mediastinum lymph nodes as well as the known 2.7 cm mass in the right lower lobe. Respiratory panel negative  Chest x-ray 8/24/2022 no acute process imaging reviewed.   - Non-STEMI/volume overload with new onset a flutter  Cardiology consulted;  2D-ECHO noted LV Ejection fraction is visually estimated at 60%. Mild left ventricular concentric hypertrophy noted. No si he was started on Eliquis. Gnificant valve abnormality   - MEI likely from dehydration on IVF   Creat improved to 1.3. Continue to monitor BMP, proBNP was up to 20,628, Lasix was ordered  -Palliative medicine team is on board  -Lumbar spine and pelvic MRI to be done at open [facility]. We will continue to follow    Thank you for allowing us to participate in the care of Mr. Brandi Kauffman.      Hortencia Watt MD   HEMATOLOGY/MEDICAL [address] MED ONCOLOGY  [address] [place] [id number]  Dept: [address] [place]: [phone]

## 2022-08-27 NOTE — PROGRESS NOTES
[facility] Cardiology    INPATIENT CARDIOLOGY FOLLOW-UP    Name: Shira Larson    Age: 82 y.o. Date of Admission: 8/23/2022 10:04 PM    Date of Service: 8/27/2022    Chief Complaint: Follow-up for new paroxysmal atrial fibrillation/flutter, type II non-STEMI, hypertension, coronary artery disease with remote RCA stent, chronic anticoagulation. Also with metastatic lung cancer. Echo this admission showing LVEF 60% with mild LVH    Interim History:  No new overnight cardiac complaints. Currently with no complaints of CP,palpitations, dizziness, or lightheadedness. Still short of breath but no PND/orthopnea. Yesterday's chest x-ray images reviewed and show increased vascular congestion.   Received IV Lasix      Telemetry  reviewed and showed sinus rhythm  SPO2 98% on 3 L per nasal cannula,  Labs pending      Review of Systems:   Cardiac: As per HPI  General: No fever, chills  Pulmonary: No cough, wheeze, or shortness of breath  GI: No nausea, vomiting,or abdominal pain  Neuro: No headache or confusion      Allergies:  No Known Allergies    Current Medications:  Current Facility-Administered Medications   Medication Dose Route Frequency Provider Last Rate Last Admin    ipratropium-albuterol (DUONEB) nebulizer solution 1 ampule  1 ampule Inhalation Q4H PRN Max Karl, APRN - CNP        traMADol (ULTRAM) tablet 25 mg  25 mg Oral Q6H PRN Syl A Costlow, APRN - CNP   25 mg at 08/26/22 1120    polyethylene glycol (GLYCOLAX) packet 17 g  17 g Oral Daily PRN Syl A Costlow, APRN - CNP        senna (SENOKOT) tablet 8.6 mg  1 tablet Oral Nightly Syl A Costlow, APRN - CNP   8.6 mg at 08/26/22 2105    aspirin EC tablet 81 mg  81 mg Oral Daily Ayse Paradise, APRN - CNP   81 mg at 08/26/22 0942    levothyroxine (SYNTHROID) tablet 75 mcg  75 mcg Oral Daily Ayse Paradise, APRN - CNP   75 mcg at 08/27/22 0542    [Held by provider] pravastatin (PRAVACHOL) tablet 40 mg  40 mg Oral Daily Ayse Paradise, APRN - CNP 40 mg at 08/25/22 7949    sertraline (ZOLOFT) tablet 50 mg  50 mg Oral Daily Drema Panning, APRN - CNP   50 mg at 08/26/22 0942    sodium chloride flush 0.9 % injection 5-40 mL  5-40 mL IntraVENous 2 times per day Drema Panning, APRN - CNP   10 mL at 08/26/22 2253    sodium chloride flush 0.9 % injection 5-40 mL  5-40 mL IntraVENous PRN Drema Panning, APRN - CNP        0.9 % sodium chloride infusion   IntraVENous PRN Drema Panning, APRN - CNP   Stopped at 08/26/22 1157    perflutren lipid microspheres (DEFINITY) injection 1.65 mg  1.5 mL IntraVENous ONCE PRN Drema Panning, APRN - CNP        metoprolol succinate (TOPROL XL) extended release tablet 25 mg  25 mg Oral BID Kate Christianson MD   25 mg at 08/26/22 2105    apixaban (ELIQUIS) tablet 5 mg  5 mg Oral BID Kate Christianson MD   5 mg at 08/26/22 2105      sodium chloride Stopped (08/26/22 1157)       Physical Exam:  BP (!) 117/55   Pulse 70   Temp 97.7 °F (36.5 °C) (Temporal)   Resp 16   Ht 5' 9\" (1.753 m)   Wt 209 lb 1 oz (94.8 kg)   SpO2 98%   BMI 30.87 kg/m²   Weight change: Wt Readings from Last 3 Encounters:   08/26/22 209 lb 1 oz (94.8 kg)   08/23/22 186 lb 8 oz (84.6 kg)   08/16/22 190 lb (86.2 kg)         Intake/Output:    Intake/Output Summary (Last 24 hours) at 8/27/2022 0736  Last data filed at 8/27/2022 0545  Gross per 24 hour   Intake 480 ml   Output 2700 ml   Net -2220 ml     No intake/output data recorded. General: Awake, alert, oriented x3, no acute distress    Neck: No JVD or carotid bruits,     Cardiac: Regular rate and rhythm, normal S1 and  S2, no murmurs, no S4 or S3, no  rubs. Normal carotid upstroke and no bruits. Resp: Unlabored respirations at rest.  No wheezes, rales or rhonchi. Abdomen: soft, nontender, nondistended, BS+; no masses, bruits, or hepatomegaly    Extremities: no cyanosis, clubbing, or edema. Normal pulses in the upper/lower extremities bilaterally.     Skin: Warm and dry, no bruises, evaluation and workup is recommended which could   include PET-CT and/or tissue sampling. Multiple enlarged upper mediastinal lymph nodes, the largest of which is 3.2   cm in the precarinal region. Mild-to-moderate cardiomegaly. Problem List:  Active Hospital Problems    Diagnosis     Acute on chronic respiratory failure with hypoxia (HCC) [J96.21]      Priority: Medium    NSTEMI (non-ST elevated myocardial infarction) ([facility] [address] [I21.4]      Priority: Medium    Transaminitis [R74.01]      Priority: Medium    MEI (acute kidney injury) ([facility] [address] [N17.9]      Priority: Medium    [facility] [C34.91]      Priority: Medium    Essential hypertension [I10]            ASSESSMENT/PLAN:  Paroxysmal atrial fibrillation/flutter and remains in sinus rhythm on beta-blocker. Currently on Eliquis for cardioembolic prophylaxis. Stable CAD with remote RCA RAMSEY. Continue beta-blocker, and baby aspirin. Type II non-STEMI in setting of hypotension. Hypotension resolved    Shortness of breath without features of heart failure and normal LVEF with no valvular heart disease. Yesterday's chest x-ray noted and he received IV Lasix.   Suspect secondary to lung CA    Acute kidney injury improving with creatinine 1.3 yesterday    Essential hypertension controlled      Electronically signed by Juventino Bang MD on 8/27/2022 at 7:36 AM

## 2022-08-27 NOTE — PROGRESS NOTES
[facility]                                Progress note    Subjective:     Much more comfortable today  Breathing easy  Indicates he feels well and denies any acute complaints  Feels markedly weak and debilitated    Objective:    /63   Pulse 71   Temp 97.1 °F (36.2 °C) (Temporal)   Resp 18   Ht 5' 9\" (1.753 m)   Wt 207 lb 12.8 oz (94.3 kg)   SpO2 99%   BMI 30.69 kg/m²     In: 120 [P.O.:120]  Out: 1600   In: 120   Out: 1600 [Urine:1600]    General appearance: NAD, conversant, ill-appearing but better than from admission   HEENT: AT/NC, MMM  Neck: FROM, supple  Lungs: diminished, 3L NC  CV: RRR, no MRGs  Vasc: Radial pulses 2+  Abdomen: Soft, non-tender; no masses or HSM  Extremities: No peripheral edema or digital cyanosis  Skin: no rash, lesions or ulcers  Psych: Alert and oriented to person, place and time  Neuro: Alert and interactive     Recent Labs     08/25/22  0539 08/26/22  1037 08/27/22  0740   WBC 10.4 10.4 10.9   HGB 11.3* 12.0* 12.7   HCT 36.6* 39.1 41.9    293 263       Recent Labs     08/25/22  0539 08/26/22  1037 08/27/22  0740    138 136   K 4.3 5.0 4.5    104 99   CO2 30* 27 27   BUN 41* 31* 26*   CREATININE 1.6* 1.3* 1.2   CALCIUM 7.7* 7.8* 8.3*       Assessment:    Principal Problem:    Acute on chronic respiratory failure with hypoxia (HCC)  Active Problems:    Primary adenocarcinoma of right lung (HCC)    NSTEMI (non-ST elevated myocardial infarction) (HCC)    Transaminitis    MEI (acute kidney injury) ([facility]    Essential hypertension  Resolved Problems:    * No resolved hospital problems.  *      Plan:  Patient is an 70-year-old male w/ hx of primary non-small cell lung adenocarcinoma of right lung with mets to the right upper neck who is admitted to [facility] for    Acute respiratory failure with hypoxia  -Monitor labs  -Procalcitonin 0.40  -Check respiratory panel > negative   -DuoNebs as needed  -Patient recently initiated on Keytruda 8/23  - Pulmonary vascular congestion on yesterday's x-ray-clinically improved after Lasix, repeat Lasix again today  -Currently 99% on 3 L-wean down    NSTEMI/volume overload with new onset of Aflutter   -Monitor labs, patient not likely candidate for intervention/PCI due to multiple comorbidities including lung cancer with metastatic disease  -Serial troponin [id number]  -proBNP 5,104 > 20,628 today, give lasix 40mg x1-repeat again today  -Echocardiogram > 60% EF.  Mild LVH  -Cardiology consulted  -Started on Eliquis yesterday by cardio   -     MEI-likely from dehydration, failure to thrive  -Monitor labs  -48/2.2 > 51/2.1 > 41/1.6  > resolved 26/1.2  -Baseline creatinine 1.3  -Avoid nephrotoxins  -IVF NS @ 125 > discontinued secondary to worsening dyspnea     Transaminates-likely from liver mets  -Monitor labs  -ALT 15 > 384 > improving today 208 > 145  -AST 23 > 395 > improving today 92 > 48  -Total bili 1.3 > 0.8 today   -Denies any abdominal pain  -Hold statin    Palliative care following for symptom management-appreciate heme  Case discussed with family at bedside  Oncology to discuss patient's prognosis his family has questions regards to his current status and decline clinically since initiation of Keytruda    DVT Prophylaxis Eliquis   PT/OT  Discharge planning to rehab facility         Rolo Soto MD  7:10 PM  8/27/2022

## 2022-08-27 NOTE — PROGRESS NOTES
Inpatient Medical Oncology Progress Note    Subjective: The patient was seen and examined, slightly confused, his son is at the bedside. The shortness of breath had improved. Pain is controlled. Medications:  Reviewed and reconciled. Physical Exam:  /60   Pulse 75   Temp (!) 96.4 °F (35.8 °C) (Temporal)   Resp 18   Ht 5' 9\" (1.753 m)   Wt 207 lb 12.8 oz (94.3 kg)   SpO2 95%   BMI 30.69 kg/m²   GENERAL: Awake and alert, slightly confused. HEENT: PERRLA; EOMI. Oropharynx clear. NECK: Supple. Without lymphadenopathy. LUNGS: Decreased air entry bilaterally  CARDIOVASCULAR: Rhythm, normal rate  ABDOMEN: Soft. Non-tender, non-distended. EXTREMITIES: Without clubbing, cyanosis, or edema. NEUROLOGIC: No focal deficits.    ECOG PS 2    Lab Results   Component Value Date    WBC 10.9 08/27/2022    HGB 12.7 08/27/2022    HCT 41.9 08/27/2022    MCV 86.7 08/27/2022     08/27/2022     Lab Results   Component Value Date     08/27/2022    K 4.5 08/27/2022    CL 99 08/27/2022    CO2 27 08/27/2022    BUN 26 (H) 08/27/2022    CREATININE 1.2 08/27/2022    GLUCOSE 101 (H) 08/27/2022    CALCIUM 8.3 (L) 08/27/2022    PROT 5.9 (L) 08/26/2022    LABALBU 3.0 (L) 08/26/2022    BILITOT 0.8 08/26/2022    ALKPHOS 114 08/26/2022    AST 48 (H) 08/26/2022     (H) 08/26/2022    LABGLOM 58 08/27/2022    GFRAA >60 08/27/2022     Impression/Plan:  31-year-old gentleman, with a past medical history significant for hypertension, hyperlipidemia and hypothyroidism, who was found to have a right neck mass when he had a work-up done for right hand numbness, he had on 6/28/2022 and neck CT scan done, revealing enlarged necrotic lymph nodes on the right side of the neck, possible mediastinal and right hilar adenopathy, he had a chest CT scan done on 7/8/2022, revealing right lower lobe nodule, measuring up to 2.5 cm, highly suspicious for primary neoplasm, additional subcentimeter nodule in the left lower lobe, nonspecific, metastatic disease is not excluded, bulky mediastinal and right hilar adenopathy, compatible with steven metastatic disease, compression of the right middle lobe bronchus by right hilar adenopathy with associated volume loss of the right middle lobe, the patient had onset 11/20/2020 right neck mass biopsy, cytology was positive for malignant cells, poorly differentiated adenocarcinoma, the flow cytometry had revealed monotypic CD5 positive, CD23 positive B-cell population, with 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 The patient has a newly diagnosed non small cell lung cancer, adenocarcinoma with bulky mediastinal and right hilar adenopathy, metastatic disease to the right upper neck, he has subcentimeter nodules in the left lower lobe, discussed with the patient and his son diagnosis and prognosis, recommended to complete the staging work-up. PET scan 8/13/2022, revealing FDG avid right cervical and supraclavicular adenopathy, soft tissue mass in the right lung, additional smaller nodules consistent with metastasis, mediastinal and right hilar lymphadenopathy, lymphadenopathy, right adrenal and liver hypermetabolic metastasis, brain MRI is negative for metastatic disease. Non-small cell lung cancer, metastatic to the lymph node in the right neck, liver and adrenal gland, the uptake in the lymph nodes in the bones      PD-L1 expression is 90%, candidate for single agent Keytruda     08/23/2022, the patient was started on single agent Keytruda  Patient was unable to take care of himself later that evening. Brought by EMS to the hospital as he was minimally responsive. On arrival patient was hypotensive at 88/52 and hypoxic at 70% on room air. CTA chest revealed mild to moderate cardiomegaly with multiple enlarged upper mediastinum lymph nodes as well as the known 2.7 cm mass in the right lower lobe.   Respiratory panel negative  Chest x-ray 8/24/2022 no acute process imaging reviewed. - Non-STEMI/volume overload with new onset a flutter  Cardiology consulted;  2D-ECHO noted LV Ejection fraction is visually estimated at 60%. Mild left ventricular concentric hypertrophy noted. He was started on Eliquis. - MEI likely from dehydration on IVF   Creat improved to 1.2. Continue to monitor BMP,   Yesterday chest x-ray had revealed increased pulmonary vascular congestion, increased atelectasis or pneumonia in the left lung base with probable left pleural effusion, proBNP was up to 20,628, Lasix was ordered, doing better clinically.  -Palliative medicine team is on board  -Lumbar spine and pelvic MRI to be done at open [facility]. -Upon DC will be going [facility]. Thank you for allowing us to participate in the care of Mr. Hansel Irby.      Yaakov Samson MD   HEMATOLOGY/MEDICAL [address] [facility]  [address] MED ONCOLOGY  [address] [place] [phone]  [facility]pt: [facility]: [phone]

## 2022-08-28 PROBLEM — Z71.89 GOALS OF CARE, COUNSELING/DISCUSSION: Status: ACTIVE | Noted: 2022-01-01

## 2022-08-28 NOTE — PROGRESS NOTES
[facility] Cardiology    INPATIENT CARDIOLOGY FOLLOW-UP    Name: Derek Coronado    Age: 82 y.o. Date of Admission: 8/23/2022 10:04 PM    Date of Service: 8/28/2022    Chief Complaint: Follow-up for new paroxysmal atrial fibrillation/flutter, type II non-STEMI, hypertension, coronary artery disease with remote RCA stent, chronic anticoagulation. Also with metastatic lung cancer. Echo this admission showing LVEF 60% with mild LVH    8/27 interim History:  No new overnight cardiac complaints. Currently with no complaints of CP,palpitations, dizziness, or lightheadedness. Still short of breath but no PND/orthopnea. Yesterday's chest x-ray images reviewed and show increased vascular congestion. Received IV Lasix      Telemetry  reviewed and showed sinus rhythm  SPO2 98% on 3 L per nasal cannula,  Labs pending    8/28:  No cardiac complaints today. No PND/orthopnea. Denies palpitations, syncope or near syncope. Still mildly short of breath  Telemetry reviewed: Sinus rhythm.   SPO2 95% on 2 L per nasal cannula    Review of Systems:   Cardiac: As per HPI  General: No fever, chills  Pulmonary: No cough, wheeze, or shortness of breath  GI: No nausea, vomiting,or abdominal pain  Neuro: No headache or confusion      Allergies:  No Known Allergies    Current Medications:  Current Facility-Administered Medications   Medication Dose Route Frequency Provider Last Rate Last Admin    ipratropium-albuterol (DUONEB) nebulizer solution 1 ampule  1 ampule Inhalation Q4H PRN GREG Cortez CNP        traMADol (ULTRAM) tablet 25 mg  25 mg Oral Q6H PRN GREG Barrios CNP   25 mg at 08/27/22 2117    polyethylene glycol (GLYCOLAX) packet 17 g  17 g Oral Daily PRN Syl GREG Mejia CNP        senna (SENOKOT) tablet 8.6 mg  1 tablet Oral Nightly Syl GREG Mejia CNP   8.6 mg at 08/27/22 2118    aspirin EC tablet 81 mg  81 mg Oral Daily GREG Alvarado CNP   81 mg at 08/28/22 1834 levothyroxine (SYNTHROID) tablet 75 mcg  75 mcg Oral Daily [place] Riff, APRN - CNP   75 mcg at 08/28/22 4569    [Held by provider] pravastatin (PRAVACHOL) tablet 40 mg  40 mg Oral Daily Amari Riff, APRN - CNP   40 mg at 08/25/22 2839    sertraline (ZOLOFT) tablet 50 mg  50 mg Oral Daily [place] Riff, APRN - CNP   50 mg at 08/28/22 0818    sodium chloride flush 0.9 % injection 5-40 mL  5-40 mL IntraVENous 2 times per day Amari Riff, APRN - CNP   10 mL at 08/28/22 0818    sodium chloride flush 0.9 % injection 5-40 mL  5-40 mL IntraVENous PRN Amari Riff, APRN - CNP        0.9 % sodium chloride infusion   IntraVENous PRN [place] Riff, APRN - CNP   Stopped at 08/26/22 1157    perflutren lipid microspheres (DEFINITY) injection 1.65 mg  1.5 mL IntraVENous ONCE PRN Amari Riff, APRN - CNP        metoprolol succinate (TOPROL XL) extended release tablet 25 mg  25 mg Oral BID Nanette Fernandes MD   25 mg at 08/28/22 0818    apixaban (ELIQUIS) tablet 5 mg  5 mg Oral BID Nanette Fernandes MD   5 mg at 08/28/22 0818      sodium chloride Stopped (08/26/22 1157)       Physical Exam:  /66   Pulse 75   Temp 97.1 °F (36.2 °C) (Temporal)   Resp 18   Ht 5' 9\" (1.753 m)   Wt 207 lb (93.9 kg)   SpO2 95%   BMI 30.57 kg/m²   Weight change: Wt Readings from Last 3 Encounters:   08/28/22 207 lb (93.9 kg)   08/23/22 186 lb 8 oz (84.6 kg)   08/16/22 190 lb (86.2 kg)         Intake/Output:    Intake/Output Summary (Last 24 hours) at 8/28/2022 1028  Last data filed at 8/28/2022 0636  Gross per 24 hour   Intake 120 ml   Output 2500 ml   Net -2380 ml     No intake/output data recorded. General: Awake, alert, oriented x3, no acute distress    Neck: No JVD or carotid bruits,     Cardiac: Regular rate and rhythm, normal S1 and  S2, no murmurs, no S4 or S3, no  rubs. Normal carotid upstroke and no bruits. Resp:  No wheezes, rales or rhonchi.     Abdomen: soft, nontender, nondistended, BS+; no masses, bruits, or hepatomegaly    Extremities: no cyanosis, clubbing, or edema. Normal pulses in the upper/lower extremities bilaterally. Skin: Warm and dry, no bruises, petechiae or rashes. Neuro: moves all extremities appropriately to command, and normal sensation to light touch in the upper and lower extremities bilaterally. Laboratory Tests:  Lab Results   Component Value Date    CREATININE 1.2 08/27/2022    BUN 26 (H) 08/27/2022     08/27/2022    K 4.5 08/27/2022    CL 99 08/27/2022    CO2 27 08/27/2022     No results for input(s): CKTOTAL, CKMB, TROPONINI in the last 72 hours. Lab Results   Component Value Date    PROBNP 20,628 (H) 08/26/2022     Lab Results   Component Value Date/Time    WBC 10.9 08/27/2022 07:40 AM    RBC 4.83 08/27/2022 07:40 AM    HGB 12.7 08/27/2022 07:40 AM    HCT 41.9 08/27/2022 07:40 AM    MCV 86.7 08/27/2022 07:40 AM    MCH 26.3 08/27/2022 07:40 AM    MCHC 30.3 08/27/2022 07:40 AM    RDW 15.9 08/27/2022 07:40 AM     08/27/2022 07:40 AM    MPV 9.8 08/27/2022 07:40 AM     Recent Labs     08/26/22  1037   ALKPHOS 114   *   AST 48*   PROT 5.9*   BILITOT 0.8   LABALBU 3.0*     Lab Results   Component Value Date/Time    MG 2.2 08/24/2022 04:50 AM     Lab Results   Component Value Date/Time    PROTIME 15.1 08/23/2022 10:36 PM    INR 1.4 08/23/2022 10:36 PM     Lab Results   Component Value Date/Time    TSH 1.280 08/23/2022 10:36 PM     No components found for: CHLPL  Lab Results   Component Value Date    TRIG 79 08/24/2022     Lab Results   Component Value Date    HDL 38 08/24/2022     Lab Results   Component Value Date    LDLCALC 52 08/24/2022       Radiology:  XR CHEST PORTABLE   Final Result   Increased pulmonary vascular congestion and increased atelectasis or   pneumonia in left lung base with probable left pleural effusion. XR CHEST PORTABLE   Final Result   No acute process.          CTA PULMONARY W CONTRAST   Final Result   No evidence of pulmonary embolism. 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 Further evaluation and workup is recommended which could   include PET-CT and/or tissue sampling. Multiple enlarged upper mediastinal lymph nodes, the largest of which is 3.2   cm in the precarinal region. Mild-to-moderate cardiomegaly. Problem List:  Active Hospital Problems    Diagnosis     Acute on chronic respiratory failure with hypoxia (HCC) [J96.21]      Priority: Medium    NSTEMI (non-ST elevated myocardial infarction) ([facility] [I21.4]      Priority: Medium    Transaminitis [R74.01]      Priority: Medium    MEI (acute kidney injury) ([facility] [N17.9]      Priority: Medium    Primary adenocarcinoma of right lung ([facility] [C34.91]      Priority: Medium    Essential hypertension [I10]            ASSESSMENT/PLAN:  Paroxysmal atrial fibrillation/flutter and remains in sinus rhythm on beta-blocker. Currently on Eliquis for cardioembolic prophylaxis. Stable CAD with remote RCA RAMSEY. Continue beta-blocker, and baby aspirin. Type II non-STEMI in setting of hypotension. Hypotension resolved    Shortness of breath without features of heart failure and normal LVEF with no valvular heart disease. 8/26 chest x-ray noted and he received IV Lasix x2. Suspect secondary to lung CA    Acute kidney injury improving with creatinine  significantly improved and 1.2 yesterday    Essential hypertension controlled  No further inpatient cardiac recommendations.   Please call if any questions      Electronically signed by Rangel Agustin MD on 8/28/2022 at 10:28 AM

## 2022-08-28 NOTE — PROGRESS NOTES
Inpatient Medical Oncology Progress Note    Subjective: The patient is feeling better overall, still weak, sometimes he has wheezing, he is sitting at the edge of the bed, his son is at the bedside. Medications:  Reviewed and reconciled. Physical Exam:  /66   Pulse 75   Temp 97.1 °F (36.2 °C) (Temporal)   Resp 18   Ht 5' 9\" (1.753 m)   Wt 207 lb (93.9 kg)   SpO2 95%   BMI 30.57 kg/m²   GENERAL: Awake and alert, slightly confused. HEENT: PERRLA; EOMI. Oropharynx clear. NECK: Supple. Without lymphadenopathy. LUNGS: Decreased air entry bilaterally  CARDIOVASCULAR: Rhythm, normal rate  ABDOMEN: Soft. Non-tender, non-distended. EXTREMITIES: Without clubbing, cyanosis, or edema. NEUROLOGIC: No focal deficits.    ECOG PS 2    Lab Results   Component Value Date    WBC 10.9 08/27/2022    HGB 12.7 08/27/2022    HCT 41.9 08/27/2022    MCV 86.7 08/27/2022     08/27/2022     Lab Results   Component Value Date     08/27/2022    K 4.5 08/27/2022    CL 99 08/27/2022    CO2 27 08/27/2022    BUN 26 (H) 08/27/2022    CREATININE 1.2 08/27/2022    GLUCOSE 101 (H) 08/27/2022    CALCIUM 8.3 (L) 08/27/2022    PROT 5.9 (L) 08/26/2022    LABALBU 3.0 (L) 08/26/2022    BILITOT 0.8 08/26/2022    ALKPHOS 114 08/26/2022    AST 48 (H) 08/26/2022     (H) 08/26/2022    LABGLOM 58 08/27/2022    GFRAA >60 08/27/2022     Impression/Plan:  53-year-old gentleman, with a past medical history significant for hypertension, hyperlipidemia and hypothyroidism, who was found to have a right neck mass when he had a work-up done for right hand numbness, he had on 6/28/2022 and neck CT scan done, revealing enlarged necrotic lymph nodes on the right side of the neck, possible mediastinal and right hilar adenopathy, he had a chest CT scan done on 7/8/2022, revealing right lower lobe nodule, measuring up to 2.5 cm, highly suspicious for primary neoplasm, additional subcentimeter nodule in the left lower lobe,

## 2022-08-28 NOTE — PROGRESS NOTES
process and advanced age, in the event of cardiac arrest, the chances of surviving may likely be low. It was also reviewed that if they survived a cardiac arrest, a return to prior level of function also may be low. Son is looking at [address] documents and will discuss tomorrow  Advanced Directives: no POA or living will in [facility]  Surrogate/Legal NOK:  Bianca Garcia (Child) [phone]  Luisa Tapia (Child) [phone]    Spiritual assessment: no spiritual distress identified  Bereavement and grief: to be determined  Referrals to: none today    Thank you for the opportunity to participate in the care of Michael Ames. Reji oNrton. Brianna Pachecocock APRN-[facility]  Palliative Medicine      SUBJECTIVE:     Details of Conversation:      Chart was reviewed. Saw patient at the bedside with son present. Pain is well managed. Caro Halsted is noted to be a bit confuse. Goal is for him to go to [facility]. We did discuss code status and the other son is looking for his AD papers. They would like to discuss this further tomorrow.     Prognosis: Guarded    OBJECTIVE:     /66   Pulse 75   Temp 97.1 °F (36.2 °C) (Temporal)   Resp 18   Ht 5' 9\" (1.753 m)   Wt 207 lb (93.9 kg)   SpO2 95%   BMI 30.57 kg/m²     Physical Examination:  Gen: elderly, thin, NAD, awake, alert   HEENT: normocephalic, atraumatic, PERRL, EOMI,   Neck: trachea midline, no JVD  Lungs: respirations easy and not labored,   Heart: regular rate and rhythm, distant heart tones,   Abdomen: normoactive bowel sounds, soft, non-tender  Extremities: no clubbing, cyanosis or edema, moving all extremities    Skin: warm, dry without rashes, lesions, bruising  Neuro: alert, some confusion present, follows commands, no gross neurologic deficit    Objective data reviewed: labs, images, records, medication use, vitals, and chart    Time/Communication  Greater than 50% of time spent, total 15 minutes in counseling and coordination of care at the bedside regarding symptom management. Thank you for allowing Palliative Medicine to participate in the care of Lisa Louis. Note: This report was completed using computerLaunchr voiced recognition software. Every effort has been made to ensure accuracy; however, inadvertent computerized transcription errors may be present.

## 2022-08-28 NOTE — PROGRESS NOTES
[facility]                                Progress note    Subjective:     Patient is very confused today  Telemetry sitter in place  No acute events overnight  Feels markedly weak and debilitated    Objective:    /66   Pulse 75   Temp 97.1 °F (36.2 °C) (Temporal)   Resp 18   Ht 5' 9\" (1.753 m)   Wt 207 lb (93.9 kg)   SpO2 95%   BMI 30.57 kg/m²     In: 240 [P.O.:240]  Out: 2500   In: 240   Out: 2500 [Urine:2500]    General appearance: NAD, conversant, but nonsensical ill-appearing   HEENT: AT/NC, MMM  Neck: FROM, supple  Lungs: diminished, 2L NC  CV: RRR, no MRGs  Vasc: Radial pulses 2+  Abdomen: Soft, non-tender; no masses or HSM  Extremities: No peripheral edema or digital cyanosis  Skin: no rash, lesions or ulcers  Psych: Alert and oriented to person, place and time  Neuro: Alert and interactive     Recent Labs     08/26/22  1037 08/27/22  0740   WBC 10.4 10.9   HGB 12.0* 12.7   HCT 39.1 41.9    263       Recent Labs     08/26/22  1037 08/27/22  0740    136   K 5.0 4.5    99   CO2 27 27   BUN 31* 26*   CREATININE 1.3* 1.2   CALCIUM 7.8* 8.3*       Assessment:    Principal Problem:    Acute on chronic respiratory failure with hypoxia (HCC)  Active Problems:    Primary adenocarcinoma of right lung (HCC)    NSTEMI (non-ST elevated myocardial infarction) (HCC)    Transaminitis    MEI (acute kidney injury) ([facility] [address]    Essential hypertension  Resolved Problems:    * No resolved hospital problems.  *      Plan:  Patient is an 82-year-old male w/ hx of primary non-small cell lung adenocarcinoma of right lung with mets to the right upper neck who is admitted to [facility] for    Acute respiratory failure with hypoxia    -Procalcitonin 0.40  -Check respiratory panel > negative   -DuoNebs as needed  -Patient recently initiated on [address] 8/23  - Pulmonary vascular congestion resolved  -Currently 99% on 3 L-wean down    NSTEMI/volume overload with new onset of Aflutter -Monitor labs, patient not likely candidate for intervention/PCI due to multiple comorbidities including lung cancer with metastatic disease  -Serial troponin [id number]  -proBNP 5,104 > 20,628  -Echocardiogram > 60% EF. Mild LVH  -Cardiology following  -Started on Eliquis   -BNP - 20,628     MEI-likely from dehydration, failure to thrive  -Monitor labs  -48/2.2 > 51/2.1 > 41/1.6  > resolved 26/1.2  -Baseline creatinine 1.2  -Avoid nephrotoxins  -IVF NS @ 125 > discontinued secondary to worsening dyspnea     Transaminates-likely from liver mets  -Monitor labs  -ALT 15 > 384 > improving  208 > 145  -AST 23 > 395 > improving 92 > 48  -Total bili 1.3 > 0.8 today   -Denies any abdominal pain  -Hold statin    Palliative care following for symptom management  Oncology to discuss patient's prognosis his family has questions regards to his current status and decline clinically since initiation of Keytruda    DVT Prophylaxis Eliquis   PT/OT  Discharge planning to rehab facility       GREG Matias CNP  12:42 PM  8/28/2022     Above note edited to reflect my thoughts     I personally saw, examined and provided care for the patient. Radiographs, labs and medication list were reviewed by me independently. The case was discussed in detail and plans for care were established. Review of Luly DEWEY CNP, documentation was conducted and revisions were made as appropriate directly by me. I agree with the above documented exam, problem list, and plan of care.      Shawn Shahid MD  4:27 PM  8/28/2022

## 2022-08-29 NOTE — PLAN OF CARE
Problem: ABCDS Injury Assessment  Goal: Absence of physical injury  Outcome: Progressing     Problem: Skin/Tissue Integrity  Goal: Absence of new skin breakdown  Description: 1. Monitor for areas of redness and/or skin breakdown  2. Assess vascular access sites hourly  3. Every 4-6 hours minimum:  Change oxygen saturation probe site  4. Every 4-6 hours:  If on nasal continuous positive airway pressure, respiratory therapy assess nares and determine need for appliance change or resting period.   Outcome: Progressing     Problem: Pain  Goal: Verbalizes/displays adequate comfort level or baseline comfort level  Outcome: Progressing

## 2022-08-29 NOTE — PROGRESS NOTES
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 The patient has a newly diagnosed non small cell lung cancer, adenocarcinoma with bulky mediastinal and right hilar adenopathy, metastatic disease to the right upper neck, he has subcentimeter nodules in the left lower lobe, discussed with the patient and his son diagnosis and prognosis, recommended to complete the staging work-up. PET scan 8/13/2022, revealing FDG avid right cervical and supraclavicular adenopathy, soft tissue mass in the right lung, additional smaller nodules consistent with metastasis, mediastinal and right hilar lymphadenopathy, lymphadenopathy, right adrenal and liver hypermetabolic metastasis, brain MRI is negative for metastatic disease. Non-small cell lung cancer, metastatic to the lymph node in the right neck, liver and adrenal gland, the uptake in the lymph nodes in the bones      PD-L1 expression is 90%, candidate for single agent Keytruda     08/23/2022, the patient was started on single agent Keytruda  Patient was unable to take care of himself later that evening. Brought by EMS to the hospital as he was minimally responsive. On arrival patient was hypotensive at 88/52 and hypoxic at 70% on room air. CTA chest revealed mild to moderate cardiomegaly with multiple enlarged upper mediastinum lymph nodes as well as the known 2.7 cm mass in the right lower lobe. On DuoNebs. O2 NC  Respiratory panel negative  Chest x-ray 8/24/2022 no acute process imaging reviewed.   Non-STEMI/volume overload with new onset a flutter  Cardiology consulted;  2D-ECHO noted LV Ejection fraction is visually estimated at 60%. Mild left ventricular concentric hypertrophy noted. He was stared on Eliquis  MEI likely from dehydration on IVF   Creat normalized to 0.9 today 08/29/2022  Continue to monitor BMP  Chest x-ray 8/26/2022 increased pulmonary vascular congestion and increased atelectasis in left lung base with probable left pleural effusion. Imaging reviewed.   Palliative care team on board for symptom management  Lumbar spine and pelvic MRI to be done at [facility] in [facility]  Will continue to follow    Jason Leavitt MD   8/29/2022

## 2022-08-29 NOTE — PROGRESS NOTES
Palliative Care Department  [phone]  Palliative Care Progress Note  Provider Aretha Jin, GREG - CNP      PATIENT: Rosio Martel  : 1935  MRN: [id number]  ADMISSION DATE: 2022 10:04 PM  Referring Provider: Keke Calles MD    Palliative Medicine was consulted on hospital day 5 for assistance with Symptom management     HPI:     Justen Marshall is a 82 y.o. y/o male with a history of non-small cell lung cancer and CLL/SLL, HLD, HTN, hypothyroidism diagnosed 2022, known to Dr. Terence Culver,  who presented to [address] [place] [address] on 2022 with fatigue and shortness of breath. Patient reported of progressive fatigue and shortness of breath, after arriving home from receiving Keytruda treatment earlier in the day. At the emergency room patient was found to be hypotensive with blood pressure 88/52, and found hypoxic around 70% on room air. CT of the pulmonary reveals mild to moderate cardiomegaly with with multiple mediastinal lymph nodes, 2.7 cm mass in the right lower lobe. Significant laboratory findings indicate BUN/creatinine 48/2.2, proBNP 5100, worsening L Fts, leukocytosis, Pro-Dean 0.40. ASSESSMENT/PLAN:     Pertinent Hospital Diagnoses     Acute on chronic respiratory failure  MEI  Non-small cell lung cancer and CLL/SLL:              -Known to Dr. Terence Culver              -Diagnosed 2022              -On Keytruda    Pain:   -Tramadol 25 mg every 6 hours as needed    Palliative Care Encounter / Counseling Regarding Goals of Care  Please see detailed goals of care discussion as below  At this time, Rosio Martel, Does Not have capacity for medical decision-making.   Capacity is time limited and situation/question specific  During encounter Cliff Becerra was surrogate medical decision-maker  Outcome of goals of care meeting:  Continue with current medical treatment  Plan for [facility] placement  CODE STATUS discussed, and changed to limited DNR [facility] DNI  Code status Limited DNR [facility] Medicine to participate in the care of Luis Shadre. Note: This report was completed using [employer] voiced recognition software. Every effort has been made to ensure accuracy; however, inadvertent computerized transcription errors may be present.

## 2022-08-29 NOTE — PLAN OF CARE
Problem: Discharge Planning  Goal: Discharge to home or other facility with appropriate resources  Outcome: Progressing     Problem: ABCDS Injury Assessment  Goal: Absence of physical injury  Outcome: Progressing     Problem: Skin/Tissue Integrity  Goal: Absence of new skin breakdown  Outcome: Progressing     Problem: Pain  Goal: Verbalizes/displays adequate comfort level or baseline comfort level  Outcome: Progressing

## 2022-08-29 NOTE — ACP (ADVANCE CARE PLANNING)
Received copy of [address] from [facility] as agent and Son Parris Mittal as first alternate. Copy placed in Soft chart with stickers.     Bubba Jacobson, L.S.W.  [phone]

## 2022-08-29 NOTE — PROGRESS NOTES
[facility]                                Progress note    Subjective:     He appears much more comfortable  No acute complaints currently  Still confused and requiring telesitter in place    Objective:    BP (!) 163/76   Pulse 68   Temp 97.1 °F (36.2 °C) (Temporal)   Resp 25   Ht 5' 9\" (1.753 m)   Wt 206 lb 6.4 oz (93.6 kg)   SpO2 96%   BMI 30.48 kg/m²     In: 180 [P.O.:180]  Out: 200   In: 180   Out: 200 [Urine:200]    General appearance: NAD, conversant, but nonsensical ill-appearing   HEENT: AT/NC, MMM  Neck: FROM, supple  Lungs: diminished, 2L NC  CV: RRR, no MRGs  Vasc: Radial pulses 2+  Abdomen: Soft, non-tender; no masses or HSM  Extremities: No peripheral edema or digital cyanosis  Skin: no rash, lesions or ulcers  Unable to assess neurological and psych status secondary to current condition     Recent Labs     08/27/22  0740 08/29/22  0604   WBC 10.9 12.5*   HGB 12.7 12.0*   HCT 41.9 38.4    269       Recent Labs     08/27/22  0740 08/29/22  0604    141   K 4.5 3.8   CL 99 95*   CO2 27 38*   BUN 26* 21   CREATININE 1.2 0.9   CALCIUM 8.3* 8.4*       Assessment:    Principal Problem:    Acute on chronic respiratory failure with hypoxia (HCC)  Active Problems:    Primary adenocarcinoma of right lung (HCC)    NSTEMI (non-ST elevated myocardial infarction) (HCC)    Transaminitis    MEI (acute kidney injury) ([facility] [address]    Goals of care, counseling/discussion    Essential hypertension  Resolved Problems:    * No resolved hospital problems.  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  Mild LVH  -Cardiology following  -Started on Eliquis   -BNP - 20,628     MEI-likely from dehydration, failure to thrive  -Monitor labs  -48/2.2 > 51/2.1 > 41/1.6  > resolved 21/0.9  -Baseline creatinine 1.2  -Avoid nephrotoxins  -IVF NS @ 125 > discontinued secondary to worsening dyspnea     Transaminates-likely from liver mets  -Monitor labs  LFTs improved, re check in a.m.  -Denies any abdominal pain  -Hold statin    Palliative care following for symptom management  Oncology to discuss patient's prognosis his family has questions regards to his current status and decline clinically since initiation of Keytruda    DVT Prophylaxis Eliquis   PT/OT  Discharge planning to rehab facility, once accepted and authorization done      Army Fabrizio MD  7:07 PM  8/29/2022

## 2022-08-29 NOTE — CARE COORDINATION
Spoke with Pt and Son Lambert Cabello confirming that discharge Plan is to [facility]. Informed Pt that I received copy of DPOAHC and placed on soft chart. See soft chart for Envelope, Ambulance Form and PASRR. SW/CM to follow for discharge needs.    Elmer Cardoza, L.S.W.  [phone]

## 2022-08-30 NOTE — PLAN OF CARE
Problem: Discharge Planning  Goal: Discharge to home or other facility with appropriate resources  8/30/2022 1708 by Darrin Bartholomew RN  Outcome: Progressing  8/29/2022 2240 by France Francis RN  Outcome: Progressing     Problem: ABCDS Injury Assessment  Goal: Absence of physical injury  8/30/2022 1633 by Darrin Bartholomew RN  Outcome: Progressing  8/29/2022 2333 by Danae Lo RN  Outcome: Progressing  8/29/2022 2240 by France Francis RN  Outcome: Progressing     Problem: Skin/Tissue Integrity  Goal: Absence of new skin breakdown  Description: 1. Monitor for areas of redness and/or skin breakdown  2. Assess vascular access sites hourly  3. Every 4-6 hours minimum:  Change oxygen saturation probe site  4. Every 4-6 hours:  If on nasal continuous positive airway pressure, respiratory therapy assess nares and determine need for appliance change or resting period.   8/30/2022 7312 by Darrin Bartholomew RN  Outcome: Progressing  8/29/2022 2333 by Danae Lo RN  Outcome: Progressing  8/29/2022 2240 by France Francis RN  Outcome: Progressing     Problem: Pain  Goal: Verbalizes/displays adequate comfort level or baseline comfort level  8/30/2022 0922 by Darrin Bartholomew RN  Outcome: Progressing  8/29/2022 2333 by Danae Lo RN  Outcome: Progressing

## 2022-08-30 NOTE — DISCHARGE INSTR - COC
Continuity of Care Form    Patient Name: Vijaya Gomez   :  1935  MRN:  [id number]    Admit date:  2022  Discharge date:  ***    Code Status Order: Limited   Advance Directives:     Admitting Physician:  No admitting provider for patient encounter. PCP: Cinthya Clay DO    Discharging Nurse: [facility] Unit/Room#: 1200/5267-Q  Discharging Unit Phone Number: ***    Emergency Contact:   Extended Emergency Contact Information  Primary Emergency Contact: [address] Phone: [phone]  Mobile Phone: [phone]  Relation: Child  Secondary Emergency Contact: [address]  Mobile Phone: [phone]  Relation: Child   needed? No    Past Surgical History:  Past Surgical History:   Procedure Laterality Date    CHOLECYSTECTOMY      CORONARY ANGIOPLASTY WITH STENT PLACEMENT      TONSILLECTOMY         Immunization History: There is no immunization history on file for this patient.     Active Problems:  Patient Active Problem List   Diagnosis Code    Essential hypertension I10    Acquired hypothyroidism E03.9    Orthostatic hypotension I95.1    Primary adenocarcinoma of right lung (HCC) C34.91    Acute on chronic respiratory failure with hypoxia (HCC) J96.21    NSTEMI (non-ST elevated myocardial infarction) ([facility] [address] I21.4    Transaminitis R74.01    MEI (acute kidney injury) ([facility] [address] N17.9    Goals of care, counseling/discussion Z71.89       Isolation/Infection:   Isolation            No Isolation          Patient Infection Status       Infection Onset Added Last Indicated Last Indicated By Review Planned Expiration Resolved Resolved By    COVID-19 (Rule Out) 22 Respiratory Panel, Molecular, with COVID-19 (Restricted: peds pts or suitable admitted adults) (Ordered) 22      Resolved    COVID-19 (Rule Out) 22 COVID-19, Rapid (Ordered)   22 Rule-Out Test Resulted            Nurse Assessment:  Last Vital Signs: /67   Pulse 61   Temp 97.9 °F (36.6 °C) (Temporal)   Resp 19   Ht 5' 9\" (1.753 m)   Wt 206 lb 6.4 oz (93.6 kg)   SpO2 95%   BMI 30.48 kg/m²     Last documented pain score (0-10 scale): Pain Level: 0  Last Weight:   Wt Readings from Last 1 Encounters:   08/29/22 206 lb 6.4 oz (93.6 kg)     Mental Status:  oriented, alert, coherent, logical, thought processes intact, and able to concentrate and follow conversation    IV Access:  - None    Nursing Mobility/ADLs:  Walking   Assisted  Transfer  Assisted  Bathing  Assisted  Dressing  Assisted  Toileting  Assisted  Feeding  [facility] Delivery   whole    Wound Care Documentation and Therapy:        Elimination:  Continence: Bowel: Yes  Bladder: No  Urinary Catheter: {Urinary Catheter:[id number]}   Colostomy/Ileostomy/Ileal Conduit: No       Date of Last BM: ***    Intake/Output Summary (Last 24 hours) at 8/30/2022 1409  Last data filed at 8/30/2022 1301  Gross per 24 hour   Intake 480 ml   Output 700 ml   Net -220 ml     I/O last 3 completed shifts: In: 180 [P.O.:180]  Out: 500 [Urine:500]    Safety Concerns: At Risk for Falls    Impairments/Disabilities:      None    Nutrition Therapy:  Current Nutrition Therapy:   - Oral Diet:  General    Routes of Feeding: Oral  Liquids: Thin Liquids  Daily Fluid Restriction: no  Last Modified Barium Swallow with Video (Video Swallowing Test): not done    Treatments at the Time of Hospital Discharge:   Respiratory Treatments: ***  Oxygen Therapy:  is on oxygen at 3 L/min per nasal cannula.   Ventilator:    - No ventilator support    Rehab Therapies: Physical Therapy and Occupational Therapy  Weight Bearing Status/Restrictions: No weight bearing restrictions  Other Medical Equipment (for information only, NOT a DME order):  wheelchair and walker  Other Treatments: ***    Patient's personal belongings (please select all that are sent with patient):  Barbara GREER SIGNATURE: Electronically signed by Vira Whaley on 8/30/22 at 2:47 PM EDT    CASE MANAGEMENT/SOCIAL WORK SECTION    Inpatient Status Date: ***    Readmission Risk Assessment Score:  Readmission Risk              Risk of Unplanned Readmission:  13           Discharging to Facility/ Agency   Name:   Address:  Phone:  Fax:    Dialysis Facility (if applicable)   Name:  Address:  Dialysis Schedule:  Phone:  Fax:    / signature: {Esignature:[id number]}    PHYSICIAN SECTION    Prognosis: Fair    Condition at Discharge: Stable    Rehab Potential (if transferring to Rehab): Fair    Recommended Labs or Other Treatments After Discharge: cbc and bmp in 3 days     Physician Certification: I certify the above information and transfer of López Estevez  is necessary for the continuing treatment of the diagnosis listed and that he requires [facility] for less 30 days.      Update Admission H&P: No change in H&P    PHYSICIAN SIGNATURE:  Electronically signed by Curt Paul MD on 8/30/22 at 2:10 PM EDT

## 2022-08-30 NOTE — PROGRESS NOTES
Inpatient Medical Oncology Progress Note    Subjective: The patient was seen and examined, he is sitting up in the chair, his son is at the bedside. He is feeling better overall, no pain at this time. Medications:  Reviewed and reconciled. Physical Exam:  /67   Pulse 61   Temp 97.9 °F (36.6 °C) (Temporal)   Resp 19   Ht 5' 9\" (1.753 m)   Wt 206 lb 6.4 oz (93.6 kg)   SpO2 98%   BMI 30.48 kg/m²   GENERAL: Awake and alert, slightly confused. HEENT: PERRLA; EOMI. Oropharynx clear. NECK: Supple. Without lymphadenopathy. LUNGS: Decreased air entry bilaterally  CARDIOVASCULAR: Rhythm, normal rate  ABDOMEN: Soft. Non-tender, non-distended. EXTREMITIES: Without clubbing, cyanosis, or edema. NEUROLOGIC: No focal deficits.    ECOG PS 2    Lab Results   Component Value Date    WBC 12.5 (H) 08/29/2022    HGB 12.0 (L) 08/29/2022    HCT 38.4 08/29/2022    MCV 84.6 08/29/2022     08/29/2022     Lab Results   Component Value Date     08/30/2022    K 5.0 08/30/2022    CL 95 (L) 08/30/2022    CO2 30 (H) 08/30/2022    BUN 20 08/30/2022    CREATININE 0.9 08/30/2022    GLUCOSE 93 08/30/2022    CALCIUM 8.8 08/30/2022    PROT 6.4 08/30/2022    LABALBU 2.8 (L) 08/30/2022    BILITOT 1.9 (H) 08/30/2022    ALKPHOS 125 08/30/2022    AST 51 (H) 08/30/2022    ALT 51 (H) 08/30/2022    LABGLOM >60 08/30/2022    GFRAA >60 08/30/2022     Impression/Plan:  82-year-old gentleman, with a past medical history significant for hypertension, hyperlipidemia and hypothyroidism, who was found to have a right neck mass when he had a work-up done for right hand numbness, he had on 6/28/2022 and neck CT scan done, revealing enlarged necrotic lymph nodes on the right side of the neck, possible mediastinal and right hilar adenopathy, he had a chest CT scan done on 7/8/2022, revealing right lower lobe nodule, measuring up to 2.5 cm, highly suspicious for primary neoplasm, additional subcentimeter nodule in the left lower lobe, nonspecific, metastatic disease is not excluded, bulky mediastinal and right hilar adenopathy, compatible with steven metastatic disease, compression of the right middle lobe bronchus by right hilar adenopathy with associated volume loss of the right middle lobe, the patient had onset 11/20/2020 right neck mass biopsy, cytology was positive for malignant cells, poorly differentiated adenocarcinoma, the flow cytometry had revealed monotypic CD5 positive, CD23 positive B-cell population, with 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 The patient has a newly diagnosed non small cell lung cancer, adenocarcinoma with bulky mediastinal and right hilar adenopathy, metastatic disease to the right upper neck, he has subcentimeter nodules in the left lower lobe, discussed with the patient and his son diagnosis and prognosis, recommended to complete the staging work-up. PET scan 8/13/2022, revealing FDG avid right cervical and supraclavicular adenopathy, soft tissue mass in the right lung, additional smaller nodules consistent with metastasis, mediastinal and right hilar lymphadenopathy, lymphadenopathy, right adrenal and liver hypermetabolic metastasis, brain MRI is negative for metastatic disease. Non-small cell lung cancer, metastatic to the lymph node in the right neck, liver and adrenal gland, the uptake in the lymph nodes in the bones      PD-L1 expression is 90%, candidate for single agent Keytruda     08/23/2022, the patient was started on single agent Keytruda  Patient was unable to take care of himself later that evening. Brought by EMS to the hospital as he was minimally responsive. On arrival patient was hypotensive at 88/52 and hypoxic at 70% on room air. CTA chest revealed mild to moderate cardiomegaly with multiple enlarged upper mediastinum lymph nodes as well as the known 2.7 cm mass in the right lower lobe.   Respiratory panel negative  Chest x-ray 8/24/2022 no acute process imaging reviewed. - Non-STEMI/volume overload with new onset a flutter  Cardiology consulted;  2D-ECHO noted LV Ejection fraction is visually estimated at 60%. Mild left ventricular concentric hypertrophy noted. He was started on Eliquis. - MEI likely from dehydration received IVF   Creat normalized, 0.9.  8/26: chest x-ray had revealed increased pulmonary vascular congestion, increased atelectasis or pneumonia in the left lung base with probable left pleural effusion, proBNP was up to 20,628, Lasix was ordered, doing better clinically.  -Prognosis was discussed with the patient and his son, for now he would like to continue with treatment.  -Palliative medicine team is on board  -Lumbar spine and pelvic MRI to be done at open [facility].  -Okay for LASHAWN from heme-onc POV with outpatient follow-up. Thank you for allowing us to participate in the care of Mr. Filippo Marley.      Alejandra Hector MD   HEMATOLOGY/MEDICAL [facility]  [address] MED ONCOLOGY  [address] [place] [id number]  Dept: [address]: [phone]

## 2022-08-30 NOTE — CARE COORDINATION
This CM checked for discharge, attending agreeable to discharge; transportation set-up for 6:30 PM, this CM notified [facility] liaison, charge RN Vidal Saenz, bedside RN Sebastian Hartman pt and son Luis Tripathi notified at bedside. Envelope and ambulance form completed by SW/SB in soft-chart.

## 2022-08-30 NOTE — PROGRESS NOTES
acute care setting. Patient response to education:   Pt verbalized understanding Pt demonstrated skill Pt requires further education in this area   Yes NA No     ASSESSMENT:    Comments:    Pt was in bed upon room entry; agreeable to PT treatment. RN cleared pt to be left sitting in chair after session. Pt required light assistance during supine to sit transfer. Sitting balance is good at EOB. Pt denied dizziness with all positional changes. O2 sat was 94-96% on 3 L O2/min with all activity. Pt completed stand pivot to chair with [address]. Steps were small and pt was mildly unsteady. No LOB occurred. Pt was seated. Pt wished to continue eating lunch so further activity was deferred. Pt verbalized understanding to call for assistance when he wishes to return to bed. Pt was left in chair with all needs met at conclusion of session. RN notified of pt's performance. Treatment:  Patient practiced and was instructed in the following treatment:    Therapeutic activities:  Bed mobility: Pt was cued for technique during supine to sit transfer. Transfers: Pt was cued for hand placement during sit <> stand transfers. Ambulation: Pt ambulated short distance to chair with [address]. Pt was cued for [address] technique/approximation and posture. Vitals and symptoms were closely monitored throughout session. PLAN:    Patient is making Fair progress towards established goals. Will continue with current POC.       Time in: 1140  Time out: 1205    Total Treatment Time 25 minutes     CPT codes:  [] Gait training 96477 0 minutes  [] Manual therapy 55596 0 minutes  [x] Therapeutic activities 41425 25 minutes  [] Therapeutic exercises 23153 0 minutes  [] Neuromuscular reeducation 81574 0 minutes      Jeannette Sauer, PT, DPT   [id number]

## 2022-08-30 NOTE — DISCHARGE SUMMARY
[facility]   Discharge summary   Patient ID:  Derek Coronado  [de-identified]  82 y.o.  1935    Admit date: 8/23/2022    Discharge date and time: 8/30/2022    Admission Diagnoses:   Patient Active Problem List   Diagnosis    Essential hypertension    Acquired hypothyroidism    Orthostatic hypotension    Primary adenocarcinoma of right lung ([facility] [address]    Acute on chronic respiratory failure with hypoxia (HCC)    NSTEMI (non-ST elevated myocardial infarction) ([facility] [address]    Transaminitis    MEI (acute kidney injury) ([facility] [address]    Goals of care, counseling/discussion       Discharge Diagnoses: Acute respiratory failure    Consults: cardiology, hematology/oncology, and palliative med. Procedures: None    Hospital Course: The patient is a 82 y.o. 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 mets to the right upper neck who is admitted to [facility] for     Acute respiratory failure with hypoxia     -Procalcitonin 0.40  -Check respiratory panel > negative   -DuoNebs as needed  -Patient recently initiated on [address] 8/23  - Pulmonary vascular congestion resolved  -Currently 99% on 3 L-wean down     NSTEMI/volume overload with new onset of Aflutter   -Monitor labs, patient not likely candidate for intervention/PCI due to multiple comorbidities including lung cancer with metastatic disease  -Serial troponin [id number]  -proBNP 5,104 > 20,628  -Echocardiogram > 60% EF.  Mild LVH  -Cardiology following  -Started on Eliquis   -BNP - 20,628     MEI-likely from dehydration, failure to thrive  -Monitor labs  -48/2.2 > 51/2.1 > 41/1.6  > resolved 21/0.9  -Baseline creatinine 1.2  -Avoid nephrotoxins  -IVF NS @ 125 > discontinued secondary to worsening dyspnea     Transaminates-likely from liver mets  -Monitor labs  LFTs improved, re check in a.m.  -Denies any abdominal pain  -Hold statin     Palliative care following for symptom management  Oncology to discuss patient's prognosis his family has questions regards to his current status and decline clinically since initiation of Keytruda    Patient discharged home in stable conditions with medications listed below. Patient instructed to follow up with all consultants and PCP within 3 weeks of discharge. Recent Labs     08/29/22  0604   WBC 12.5*   HGB 12.0*   HCT 38.4          Recent Labs     08/29/22  0604 08/30/22  0631    135   K 3.8 5.0   CL 95* 95*   CO2 38* 30*   BUN 21 20   CREATININE 0.9 0.9   CALCIUM 8.4* 8.8       Echo Complete    Result Date: 8/24/2022  Transthoracic Echocardiography Report (TTE)  Demographics   Patient Name       Alfie Moody      Gender               Male                     [address] Record     [id number]       Room Number          7878  Number   Account #          [de-identified]      Procedure Date       08/24/2022   Corporate ID                      Ordering Physician   Harmeet Sutton MD   Accession Number   [id number]     Referring Physician   Date of Birth      1935     Sonographer          Maria Del Rosario Olmos [facility] [place]   Age                82 year(s)     Interpreting         The [address] [place] [address]                                    Physician            Harmeet Sutton MD                                     Any Other  Procedure Type of Study   TTE procedure:Echo Complete W/Doppler & Color Flow. Procedure Date Date: 08/24/2022 Start: 12:15 PM Study Location: Portable Indications:Shortness of breath and NSTEMI. Patient Status: Routine Height: 69 inches Weight: 186 pounds BSA: 2 m^2 BMI: 27.47 kg/m^2 Rhythm: Within normal limits BP: 107/71 mmHg  Findings   Left Ventricle  LV Ejection fraction is visually estimated at 60%. Mild left ventricular concentric hypertrophy noted. No significant valve abnormality. Right Ventricle  Normal right ventricle structure and function. Left Atrium  Normal left atrium. Interatrial septum not well visualized.    Right Atrium  Normal right atrium. Mitral Valve  Physiologic and/or trace mitral regurgitation is present. No evidence of hemodynamically significant mitral stenosis. Tricuspid Valve  Normal tricuspid valve structure and function. Aortic Valve  Trileaflet aortic valve. Normal leaflet mobility. No aortic stenosis. No aortic regurgitation. Pulmonic Valve  Normal pulmonic valve structure and function. Pericardial Effusion  No evidence for hemodynamically significant pericardial effusion. Aorta  Normal aortic root and ascending aorta. Miscellaneous  The inferior vena cava diameter is normal with normal respiratory  variation. Conclusions   Summary  LV Ejection fraction is visually estimated at 60%. Mild left ventricular concentric hypertrophy noted. No significant valve abnormality.    Signature   ----------------------------------------------------------------  Electronically signed by Daniela Cho MD(Interpreting  physician) on 08/24/2022 02:38 PM  ----------------------------------------------------------------  M-Mode/2D Measurements & Calculations   LV Diastolic    LV Systolic Dimension: 2.3   AV Cusp Separation: 2.1 cmLA  Dimension: 3.6  cm                           Dimension: 3.8 cmAO Root  cm              LV Volume Diastolic: 39.5 ml Dimension: 3.9 cm  LV FS:36.1 %    LV Volume Systolic: 03.6 ml  LV PW           LV EDV/LV EDV Index: 36.8  Diastolic: 1.5  MF/43 FL/G^6JI ESV/LV ESV  cm              Index: 18.9 ml/9ml/ m^2      RV Diastolic Dimension: 3.6  Septum          EF Calculated: 65.8 %        cm  Diastolic: 1.6  LV Mass Index: 106 l/min*m^2  cm              LV Length: 8.5 cm                                               LA volume/Index: 59.3 ml  LV Mass: 212.02 LVOT: 2 cm                   /29.66ml/m^2  g                                            RA Area: 14.4 cm^2  Doppler Measurements & Calculations   MV Peak E-Wave:     AV Peak Velocity: 1.13 LVOT Peak Velocity: 0.89 m/s  0.98 m/s m/s                    LVOT Mean Velocity: 0.62 m/s                      AV Peak Gradient: 5.13 LVOT Peak Gradient: 3.1  MV Peak Gradient:   mmHg                   mmHgLVOT Mean Gradient: 1.7  3.8 mmHg            AV Mean Velocity: 0.89 mmHg  MV Mean Gradient:   m/s                    Estimated RVSP: 37.7 mmHg  2.4 mmHg            AV Mean Gradient: 3.4  Estimated RAP:3 mmHg  MV Mean Velocity:   mmHg  0.74 m/s            AV VTI: 21.3 cm                      AV Area                TR Velocity:2.94 m/s  MV Area             (Continuity):2.14 cm^2 TR Gradient:34.65 mmHg  (continuity): 4.5                          PV Peak Velocity: 0.77 m/s  cm^2                LVOT VTI: 14.5 cm      PV Peak Gradient: 2.36 mmHg                      IVRT: 96.9 msec        PV Mean Velocity: 0.54 m/s                      Estimated PASP: 37.64  PV Mean Gradient: 1.3 mmHg                      mmHg  [email or web address] [id number]    XR CHEST PORTABLE    Result Date: 8/24/2022  EXAMINATION: ONE XRAY VIEW OF THE CHEST 8/23/2022 11:47 pm COMPARISON: 02/27/2019 HISTORY: ORDERING SYSTEM PROVIDED HISTORY: sob TECHNOLOGIST PROVIDED HISTORY: Reason for exam:->sob What reading provider will be dictating this exam?->CRC FINDINGS: The lungs are without acute focal process. There is no effusion or pneumothorax. The cardiomediastinal silhouette is without acute process. The osseous structures are without acute process. Elevation right is     No acute process. CTA PULMONARY W CONTRAST    Result Date: 8/23/2022  EXAMINATION: CTA OF THE CHEST 8/23/2022 10:26 pm TECHNIQUE: CTA of the chest was performed after the administration of intravenous contrast.  Multiplanar reformatted images are provided for review. MIP images are provided for review.  Automated exposure control, iterative reconstruction, and/or weight based adjustment of the mA/kV was utilized to reduce the radiation dose to as low as reasonably achievable. COMPARISON: None. HISTORY: ORDERING SYSTEM PROVIDED HISTORY: sob TECHNOLOGIST PROVIDED HISTORY: Reason for exam:->sob Decision Support Exception - unselect if not a suspected or confirmed emergency medical condition->Emergency Medical Condition (MA) What reading provider will be dictating this exam?->CRC FINDINGS: Pulmonary Arteries: Pulmonary arteries are adequately opacified for evaluation. No evidence of intraluminal filling defect to suggest pulmonary embolism. Main pulmonary artery is normal in caliber. Mediastinum: Multiple enlarged upper mediastinal lymph nodes, the largest of which is 3.2 cm in the precarinal region. Mild-to-moderate cardiomegaly. The pericardium demonstrate no acute abnormality. There is no acute abnormality of the thoracic aorta. Lungs/pleura: There is a 2.7 cm mass in the posterior basal segment of the right lower lobe with pleural attachment. Further evaluation and workup is recommended which could include PET-CT and/or tissue sampling. Mild atelectasis at posterior left lung. No pleural effusion. Upper Abdomen: Status post cholecystectomy. Soft Tissues/Bones: No acute bone or soft tissue abnormality. No evidence of pulmonary embolism. 2.7 cm mass in the posterior basal segment of the right lower lobe with pleural attachment. Further evaluation and workup is recommended which could include PET-CT and/or tissue sampling. Multiple enlarged upper mediastinal lymph nodes, the largest of which is 3.2 cm in the precarinal region. Mild-to-moderate cardiomegaly. Discharge Exam:    HEENT: NCAT,  PERRLA, No JVD  Heart:  RRR, no murmurs, gallops, or rubs.   Lungs:  CTA bilaterally, no wheeze, rales or rhonchi  Abd: bowel sounds present, nontender, nondistended, no masses  Extrem:  No clubbing, cyanosis, or edema    Disposition: home     Patient Condition at Discharge: Stable    Patient Instructions: Medication List        START taking these medications      amoxicillin-clavulanate 875-125 MG per tablet  Commonly known as: AUGMENTIN  Take 1 tablet by mouth 2 times daily for 7 days     apixaban 5 MG Tabs tablet  Commonly known as: ELIQUIS  Take 1 tablet by mouth 2 times daily     furosemide 20 MG tablet  Commonly known as: Lasix  Take 1 tablet by mouth daily     metoprolol succinate 25 MG extended release tablet  Commonly known as: TOPROL XL  Take 1 tablet by mouth 2 times daily            CONTINUE taking these medications      aspirin 81 MG tablet     levothyroxine 75 MCG tablet  Commonly known as: SYNTHROID     midodrine 2.5 MG tablet  Commonly known as: PROAMATINE     * nitroGLYCERIN 0.4 MG SL tablet  Commonly known as: NITROSTAT     * nitroGLYCERIN 0.4 MG SL tablet  Commonly known as: NITROSTAT     * sertraline 50 MG tablet  Commonly known as: ZOLOFT     * sertraline 25 MG tablet  Commonly known as: ZOLOFT     therapeutic multivitamin-minerals tablet     Vitamin D3 1.25 MG (92769 UT) Caps           * This list has 4 medication(s) that are the same as other medications prescribed for you. Read the directions carefully, and ask your doctor or other care provider to review them with you.                 STOP taking these medications      metoprolol tartrate 25 MG tablet  Commonly known as: LOPRESSOR     pravastatin 40 MG tablet  Commonly known as: PRAVACHOL     traMADol 50 MG tablet  Commonly known as: Ultram               Where to Get Your Medications        These medications were sent to [facility]/pharmacy #[id number]- [id number], [place] - [address] [place] [phone] [place] [phone]  [address]      Phone: [phone]   amoxicillin-clavulanate 875-125 MG per tablet  apixaban 5 MG Tabs tablet  furosemide 20 MG tablet  metoprolol succinate 25 MG extended release tablet       Activity: activity as tolerated  Diet: cardiac diet and renal diet    Pt has been advised to:    Follow-up with Dickson Cisse,  in 1 week.   Follow-up with consultants as recommended by them    Note that over 30 minutes was spent in preparing discharge papers, discussing discharge with patient, medication review, etc.    Signed:  Paul Martinez MD  8/30/2022  2:08 PM

## 2022-09-02 NOTE — TELEPHONE ENCOUNTER
Attempted to reach patient's son, Jon Menendez, to see if they needed assistance resetting up patient's MRIs at [facility] as patient missed his previous appointment due to being inpatient. No answer and unable to leave voice mail due to mail box being full. Jon Menendez, patient's son, returned call and we spoke about his father and discussed getting MRI's rescheduled and his concerns about his father's next infusion. After discussion Jon Menendez feels his dad would not do well with MRI at this time, due to confusion and laying flat issues,and that next week he will reevaluate the situation and maybe get him scheduled at that time. He wants to proceed with his father's next follow up /infusion appointment around 9/13 and at this appointment he will discuss with physician if they will proceed with treatment. Offered to make earlier appointment date but he wants his dad to received some more rehab before appointment and decision.

## 2022-09-07 NOTE — TELEPHONE ENCOUNTER
Received call from Errol Duncan, patient's son,transferred from  schedulers. Errol Duncan wanted some information prior to scheduling his father for his next appointment. He states that his father is doing a little better after his hospital stay and confusion is better at times but remains in the nursing home. He wanted information on his father's life expectancy with and without treatment prior to bringing his father in for appointment. He states that it was reviewed in his father's initial consultation but he isn't sure what was said. Discussed with son possible virtual visit  and he is agreeable to this but would still like this information first.  Informed Major Revering that I would try to get information for him and get back to him in the next couple days for update on patient and then potentially set up a virtual visit. Yandel agreeable and appreciative.

## 2022-09-12 PROBLEM — J96.01 ACUTE RESPIRATORY FAILURE WITH HYPOXIA (HCC): Status: ACTIVE | Noted: 2022-01-01

## 2022-09-12 PROBLEM — E87.5 HYPERKALEMIA: Status: ACTIVE | Noted: 2022-01-01

## 2022-09-12 PROBLEM — R09.02 HYPOXIA: Status: ACTIVE | Noted: 2022-01-01

## 2022-09-12 PROBLEM — J81.1 PULMONARY EDEMA: Status: ACTIVE | Noted: 2022-01-01

## 2022-09-12 NOTE — H&P
Lisa Urbano MD [address]  Internal medicine   History and Physical      CHIEF COMPLAINT: Altered mental status      HISTORY OF PRESENT ILLNESS:    Patient was seen in the emergency room earlier this morning at the main campus of [facility]  Admission details noted  Son at bedside  Data reviewed in detail with him  81-year-old  man recently hospitalized and was diagnosed with non-small cell adenocarcinoma of the lung with metastatic disease to cervical and mediastinal lymph nodes  Apparently received 1 dose of immunotherapy which she did not tolerate well according to the son  He was discharged to a local subacute rehab where he was progressing fairly well up until yesterday when he became agitated and somewhat lethargic  Brought into emergency room  Being admitted for further management  Incidental urinary tract infection and peripheral edema noted    Past Medical History:    Past Medical History:   Diagnosis Date    Cancer ([facility] [address] 2022    lung, lymphoma    Hyperlipidemia     Hypertension     Hypothyroidism    Recently had atrial fibrillation and congestive heart failure    Past Surgical History:    Past Surgical History:   Procedure Laterality Date    CHOLECYSTECTOMY      CORONARY ANGIOPLASTY WITH STENT PLACEMENT  2008    TONSILLECTOMY         Medications Prior to Admission:    Not in a hospital admission. Allergies:    Patient has no known allergies. Social History:    reports that he has quit smoking. He has never used smokeless tobacco. He reports that he does not drink alcohol and does not use drugs. Family History:   family history includes Stroke in his father.     REVIEW OF SYSTEMS:  As above in the HPI, otherwise negative    PHYSICAL EXAM:    Vitals:  BP (!) 105/56   Pulse 81   Temp 97.5 °F (36.4 °C) (Oral)   Resp 18   Ht 5' 9\" (1.753 m)   Wt 206 lb (93.4 kg)   SpO2 97%   BMI 30.42 kg/m²     General: Extremely lethargic but arousable  On supplemental oxygen  HEENT:  Normocephalic, atraumatic. Pupils equal, round, reactive to light. No scleral icterus. No conjunctival injection. Oral mucosa clear no nasal discharge. Neck:  Supple no definite adenopathy  Heart:  RRR, no murmurs, gallops, rubs  Lungs:  CTA bilaterally, bilat symmetrical expansion, no wheeze, rales, or rhonchi  Abdomen: Bowel sounds present, soft, nontender, no masses, no organomegaly, no peritoneal signs  Extremities: Clubbing noted   2+ edema noted bilateral lower extremities  Skin:  Warm and dry, no open lesions or rash  Neuro:  Cranial nerves 2-12 intact, no focal deficits  Breast: deferred  Rectal: deferred  Genitalia:  deferred    LABS:  Data reviewed      ASSESSMENT:    Peripheral edema multifactorial  Principal Problem:    Acute respiratory failure with hypoxia  Urinary tract infection  Acute kidney injury  Acute diastolic heart failure  Metastatic adenocarcinoma of the lung  Failure to thrive in an adult  History of A.  Fib  Comorbidities present and past as listed below  Patient Active Problem List   Diagnosis    Essential hypertension    Acquired hypothyroidism    Orthostatic hypotension    Primary adenocarcinoma of right lung ([address]    Acute on chronic respiratory failure with hypoxia (HCC)    NSTEMI (non-ST elevated myocardial infarction) ([address]    Transaminitis    MEI (acute kidney injury) ([address]    Goals of care, counseling/discussion    Acute respiratory failure with hypoxia ([address]    Hypoxia    Pulmonary edema    Hyperkalemia           PLAN:  Admit   IV albumin followed by [facility]  Oxygen supplements  Oncology to discuss further care plans  Questions answered to son's Yesika Armenta MD  2:19 PM  9/12/2022

## 2022-09-12 NOTE — CONSULTS
Inpatient Medical Oncology Consult Note      Reason for Visit:   Consultation on a patient with metastatic NSCLC    Referring Physician:  Andres Soliz MD    PCP:  Liz Izaguirre DO    History of Present Illness:  82-year-old gentleman, with metastatic non small cell lung cancer, adenocarcinoma with bulky mediastinal and right hilar adenopathy, metastatic disease to the right upper neck, he has subcentimeter nodules in the left lower lobe     PET scan 8/13/2022, revealing FDG avid right cervical and supraclavicular adenopathy, soft tissue mass in the right lung, additional smaller nodules consistent with metastasis, mediastinal and right hilar lymphadenopathy, lymphadenopathy, right adrenal and liver hypermetabolic metastasis, brain MRI is negative for metastatic disease. Non-small cell lung cancer, metastatic to the lymph node in the right neck, liver and adrenal gland, the uptake in the lymph nodes in the bones   PD-L1 expression is 90%, candidate for single agent Keytruda  08/23/2022, the patient was started on single agent Keytruda    Recent admission for non-STEMI/volume overload with new onset a flutter. Cardiology consulted. 2D echo noted LV ejection fraction estimated at 60%. Mild left ventricular concentric hypertrophy noted. He was started on Eliquis. MEI from dehydration received IV fluid. He was discharged to a local subacute rehab where he was progressing fairly well up until yesterday when he became agitated and somewhat lethargic  Brought into emergency room  Being admitted for further management  Urinary tract infection and peripheral edema     Review of Systems;  CONSTITUTIONAL: No fever, chills. Decline in appetite and energy level. ENMT: Eyes: No diplopia; Nose: No epistaxis. Mouth: No sore throat. RESPIRATORY: No hemoptysis. +shortness of breath, cough. CARDIOVASCULAR: No chest pain, palpitations.   GASTROINTESTINAL: No nausea/vomiting, abdominal pain  GENITOURINARY: +UTI  NEURO: No syncope, presyncope, headache. Remainder:  ROS NEGATIVE    Past Medical History:      Diagnosis Date    Cancer ([facility] [address] 2022    lung, lymphoma    Hyperlipidemia     Hypertension     Hypothyroidism      Past Surgical History:      Procedure Laterality Date    CHOLECYSTECTOMY      CORONARY ANGIOPLASTY WITH STENT PLACEMENT  2008    TONSILLECTOMY       Family History:  Family History   Problem Relation Age of Onset    Stroke Father      Medications:  Reviewed and reconciled. Social History:  Social History     Socioeconomic History    Marital status:      Spouse name: Not on file    Number of children: Not on file    Years of education: Not on file    Highest education level: Not on file   Occupational History    Not on file   Tobacco Use    Smoking status: Former    Smokeless tobacco: Never    Tobacco comments:     Pt states he quit 40-50 years ago   Vaping Use    Vaping Use: Never used   Substance and Sexual Activity    Alcohol use: Never    Drug use: Never    Sexual activity: Not on file   Other Topics Concern    Not on file   Social History Narrative    Not on file     Social Determinants of Health     Financial Resource Strain: Not on file   Food Insecurity: Not on file   Transportation Needs: Not on file   Physical Activity: Not on file   Stress: Not on file   Social Connections: Not on file   Intimate Partner Violence: Not on file   Housing Stability: Not on file     Allergies:  No Known Allergies    Physical Exam:  /61   Pulse 77   Temp 97.5 °F (36.4 °C) (Oral)   Resp 20   Ht 5' 9\" (1.753 m)   Wt 206 lb (93.4 kg)   SpO2 96%   BMI 30.42 kg/m²   GENERAL: tired lethargic  HEENT: PERRLA; EOMI. Oropharynx dry   NECK: Supple. Without lymphadenopathy. LUNGS: scattered wheezing sierra  CARDIOVASCULAR: Regular rate. No murmurs, rubs or gallops. ABDOMEN: Soft. Non-tender, non-distended. EXTREMITIES: Without clubbing, cyanosis, or edema.    ECOG PS 2    Lab Results   Component Value Date    WBC 9.8 09/12/2022    HGB 9.8 (L) 09/12/2022    HCT 34.0 (L) 09/12/2022    MCV 90.4 09/12/2022     09/12/2022     Lab Results   Component Value Date     09/12/2022    K 5.3 (H) 09/12/2022    CL 95 (L) 09/12/2022    CO2 38 (H) 09/12/2022    BUN 40 (H) 09/12/2022    CREATININE 1.2 09/12/2022    GLUCOSE 143 (H) 09/12/2022    CALCIUM 8.5 (L) 09/12/2022    PROT 6.0 (L) 09/12/2022    LABALBU 3.0 (L) 09/12/2022    BILITOT 1.0 09/12/2022    ALKPHOS 229 (H) 09/12/2022    AST 53 (H) 09/12/2022    ALT 40 09/12/2022    LABGLOM 57 09/12/2022    GFRAA >60 09/12/2022     Impression/Plan:  24-year-old gentleman, with metastatic non small cell lung cancer, adenocarcinoma with bulky mediastinal and right hilar adenopathy, metastatic disease to the right upper neck, he has subcentimeter nodules in the left lower lobe     PET scan 8/13/2022, revealing FDG avid right cervical and supraclavicular adenopathy, soft tissue mass in the right lung, additional smaller nodules consistent with metastasis, mediastinal and right hilar lymphadenopathy, lymphadenopathy, right adrenal and liver hypermetabolic metastasis, brain MRI is negative for metastatic disease. Non-small cell lung cancer, metastatic to the lymph node in the right neck, liver and adrenal gland, the uptake in the lymph nodes in the bones   PD-L1 expression is 90%, candidate for single agent Keytruda  08/23/2022, the patient was started on single agent Keytruda    Admission for non-STEMI/volume overload with new onset a flutter. Cardiology consulted. 2D echo noted LV ejection fraction estimated at 60%. Mild left ventricular concentric hypertrophy noted. He was started on Eliquis. MEI from dehydration received IV fluid.    He was discharged to a local subacute rehab where he was progressing fairly well up until yesterday when he became agitated and somewhat lethargic  Being admitted for further management  Urinary tract infection and peripheral edema On Ceftriaxone  IV albumin followed by Bumex  CT head no acute intracranial abnormality  CTA chest 9/12/2022: No evidence of PE. Right lower lobe atelectasis, likely obscuring previously seen 2.7 cm pulmonary nodule. Stable appearance of subcentimeter pulmonary nodules in the left upper lobe. Small bilateral pleural effusions. Mediastinal hilar lymphadenopathy.   Limited evaluation of the upper abdomen concerning for retroperitoneal lymphadenopathy  Pulmonary team consulted  O2 nasal cannula  We will continue to follow    Thank you for allowing us to participate in the care of Mr. Arla Cheadle, MD   9/12/2022

## 2022-09-12 NOTE — CARE COORDINATION
LYNN transition of care. Pt presented to [facility] ER secondary to AMS. Pt recently d/c from this facility on 8/30 to [facility]. Readmitted with acute respiratory failure. Per Jay Messina with [facility] pt is not a bed hold but can return at d/c. Met with pt son Ayah Abraham at the bedside to discuss d/c planning. Pt is altered and not able to provide any input. Per Ayah Paradacody the d/c plan is to return to [facility]. He will update his brother Samantha Calvert of the plan when he speaks with him later today. D/c plan is [facility] when pt is medically stable. CM/SW to follow.   Tg Torres RN, CM

## 2022-09-12 NOTE — ED NOTES
Dr. Mahnaz Sandoval made aware of the desating, no further orders at this time.       Spencer Quiroga RN  09/12/22 4859

## 2022-09-12 NOTE — ED NOTES
Went into patient's room to check on him and assess if he needed pain medications. This RN found patient with his oxygen pulled out of his nose and an SpO2 in the 70s. This RN placed patient on a non-rebreather until patient was sating okay and weaned him back down to 3L n/c. BP at this time was 101/55 and HR 84. This RN notified the Dr. Najera Caller the situation and his code status of a DNR-CC Via perfect serve and asked if any further orders were needed at this time.       Haily Cueva RN  09/12/22 5870

## 2022-09-12 NOTE — ED PROVIDER NOTES
HPI:  9/12/22,   Time: 1:53 AM EDT       Karolina Serrano is a 82 y.o. male presenting to the ED for weak/fatigued/ams, beginning days ago. The complaint has been persistent, moderate in severity, and worsened by nothing. Bib ems, from nh, ams/fatigue/difficulty walking. Worse tonight, progressing past week. No fever/chills/sweats/n/v/d/cp/sob per son. Hx limited from pt due to ms. Review of Systems:   Pertinent positives and negatives are stated within HPI, all other systems reviewed and are negative.          --------------------------------------------- PAST HISTORY ---------------------------------------------  Past Medical History:  has a past medical history of Cancer ([facility] [address] Hyperlipidemia, Hypertension, and Hypothyroidism. Past Surgical History:  has a past surgical history that includes Coronary angioplasty with stent (2008); Cholecystectomy; and Tonsillectomy. Social History:  reports that he has quit smoking. He has never used smokeless tobacco. He reports that he does not drink alcohol and does not use drugs. Family History: family history includes Stroke in his father. The patients home medications have been reviewed. Allergies: Patient has no known allergies. ---------------------------------------------------PHYSICAL EXAM--------------------------------------    Constitutional/General: Alert and oriented x1, chronically ill appearing  Head: Normocephalic and atraumatic  Eyes: PERRL, EOMI, conjunctive normal, sclera non icteric  Mouth: Oropharynx clear, handling secretions, no trismus, no asymmetry of the posterior oropharynx or uvular edema  Neck: Supple, full ROM,   Respiratory: . Not in respiratory distress  Cardiovascular:  2+ distal pulses  Chest: No chest wall tenderness  GI:  Abdomen Soft, Non tender, Non distended. Musculoskeletal: Moves all extremities x 4. Warm and well perfused,   Integument: skin warm and dry. No rashes.    Lymphatic: no lymphadenopathy Phosphatase 229 (H) 40 - 129 U/L    ALT 40 0 - 40 U/L    AST 53 (H) 0 - 39 U/L   Protime-INR   Result Value Ref Range    Protime 24.6 (H) 9.3 - 12.4 sec    INR 2.2    APTT   Result Value Ref Range    aPTT 24.5 24.5 - 35.1 sec   Urinalysis with Microscopic   Result Value Ref Range    Color, UA Yellow Straw/Yellow    Clarity, UA Clear Clear    Glucose, Ur Negative Negative mg/dL    Bilirubin Urine Negative Negative    Ketones, Urine Negative Negative mg/dL    Specific Gravity, UA 1.025 1.005 - 1.030    Blood, Urine LARGE (A) Negative    pH, UA 6.0 5.0 - 9.0    Protein, UA TRACE Negative mg/dL    Urobilinogen, Urine >=8.0 (A) <2.0 E.U./dL    Nitrite, Urine Negative Negative    Leukocyte Esterase, Urine Negative Negative    Fine Casts, UA 3-5 (A) 0 - 2 /LPF    WBC, UA NONE 0 - 5 /HPF    RBC, UA 10-20 (A) 0 - 2 /HPF    Bacteria, UA FEW (A) None Seen /HPF    Amorphous, UA FEW    Lactate, Sepsis   Result Value Ref Range    Lactic Acid, Sepsis 2.3 (H) 0.5 - 1.9 mmol/L   Lactate, Sepsis   Result Value Ref Range    Lactic Acid, Sepsis 1.2 0.5 - 1.9 mmol/L   Troponin   Result Value Ref Range    Troponin, High Sensitivity 35 (H) 0 - 11 ng/L   Brain Natriuretic Peptide   Result Value Ref Range    Pro-BNP 8,473 (H) 0 - 450 pg/mL   Iron and TIBC   Result Value Ref Range    Iron 36 (L) 59 - 158 mcg/dL    TIBC 134 (L) 250 - 450 mcg/dL    Iron Saturation 27 20 - 55 %   Troponin   Result Value Ref Range    Troponin, High Sensitivity 37 (H) 0 - 11 ng/L   Magnesium   Result Value Ref Range    Magnesium 2.9 (H) 1.6 - 2.6 mg/dL   Lipid panel - fasting   Result Value Ref Range    Cholesterol, Total 118 0 - 199 mg/dL    Triglycerides 122 0 - 149 mg/dL    HDL 28 >40 mg/dL    LDL Calculated 66 0 - 99 mg/dL    VLDL Cholesterol Calculated 24 mg/dL   Hepatic function panel   Result Value Ref Range    Total Protein 6.0 (L) 6.4 - 8.3 g/dL    Albumin 3.0 (L) 3.5 - 5.2 g/dL    Alkaline Phosphatase 187 (H) 40 - 129 U/L    ALT 34 0 - 40 U/L    AST 52 (H) 0 - 39 U/L    Total Bilirubin 1.3 (H) 0.0 - 1.2 mg/dL    Bilirubin, Direct 0.9 (H) 0.0 - 0.3 mg/dL    Bilirubin, Indirect 0.4 0.0 - 1.0 mg/dL   CBC with Auto Differential   Result Value Ref Range    WBC 10.9 4.5 - 11.5 E9/L    RBC 3.34 (L) 3.80 - 5.80 E12/L    Hemoglobin 8.6 (L) 12.5 - 16.5 g/dL    Hematocrit 32.0 (L) 37.0 - 54.0 %    MCV 95.8 80.0 - 99.9 fL    MCH 25.7 (L) 26.0 - 35.0 pg    MCHC 26.9 (L) 32.0 - 34.5 %    RDW 16.7 (H) 11.5 - 15.0 fL    Platelets 797 835 - 411 E9/L    MPV 10.5 7.0 - 12.0 fL    Neutrophils % 88.6 (H) 43.0 - 80.0 %    Lymphocytes % 7.0 (L) 20.0 - 42.0 %    Monocytes % 2.6 2.0 - 12.0 %    Eosinophils % 0.4 0.0 - 6.0 %    Basophils % 0.5 0.0 - 2.0 %    Neutrophils Absolute 9.81 (H) 1.80 - 7.30 E9/L    Lymphocytes Absolute 0.76 (L) 1.50 - 4.00 E9/L    Monocytes Absolute 0.33 0.10 - 0.95 E9/L    Eosinophils Absolute 0.00 (L) 0.05 - 0.50 E9/L    Basophils Absolute 0.00 0.00 - 0.20 E9/L    Myelocyte Percent 1.8 0 - 0 %    nRBC 0.9 /100 WBC    Anisocytosis 1+     Polychromasia 1+     Hypochromia 1+     Poikilocytes 2+     Pillo Cells 2+     Ovalocytes 2+    Comprehensive Metabolic Panel   Result Value Ref Range    Sodium 143 132 - 146 mmol/L    Potassium 6.2 (H) 3.5 - 5.0 mmol/L    Chloride 98 98 - 107 mmol/L    CO2 35 (H) 22 - 29 mmol/L    Anion Gap 10 7 - 16 mmol/L    Glucose 113 (H) 74 - 99 mg/dL    BUN 57 (H) 6 - 23 mg/dL    Creatinine 2.1 (H) 0.7 - 1.2 mg/dL    GFR Non-African American 30 >=60 mL/min/1.73    GFR African American 36     Calcium 8.0 (L) 8.6 - 10.2 mg/dL   Hemoglobin and Hematocrit   Result Value Ref Range    Hemoglobin 8.8 (L) 12.5 - 16.5 g/dL    Hematocrit 31.1 (L) 37.0 - 54.0 %   EKG 12 Lead   Result Value Ref Range    Ventricular Rate 79 BPM    Atrial Rate 79 BPM    P-R Interval 186 ms    QRS Duration 84 ms    Q-T Interval 368 ms    QTc Calculation (Bazett) 421 ms    P Axis 78 degrees    R Axis 63 degrees    T Axis 59 degrees       RADIOLOGY:  Interpreted by Radiologist.  CT Head WO Contrast   Final Result   No acute intracranial abnormality. CTA PULMONARY W CONTRAST   Final Result   No evidence of pulmonary embolism. Right lower lobe atelectasis, likely obscuring previously seen 2.7 cm   pulmonary nodule. Stable appearance of subcentimeter pulmonary nodules in   the left upper lobe. Small bilateral pleural effusions. Mediastinal and hilar lymphadenopathy. Limited evaluation of the upper   abdomen concerning for retroperitoneal lymphadenopathy. XR CHEST PORTABLE   Final Result   Pulmonary vascular congestion with atelectasis. No focal consolidation. EKG:  This EKG is signed and interpreted by the EP. Time: 143  Rate: 80  Rhythm: Sinus  Interpretation: non-specific EKG  Comparison: None      ------------------------- NURSING NOTES AND VITALS REVIEWED ---------------------------   The nursing notes within the ED encounter and vital signs as below have been reviewed by myself. BP (!) 84/50   Pulse 74   Temp 96.8 °F (36 °C) (Temporal)   Resp 18   Ht 5' 9\" (1.753 m)   Wt 206 lb (93.4 kg)   SpO2 100%   BMI 30.42 kg/m²   Oxygen Saturation Interpretation: Normal    The patients available past medical records and past encounters were reviewed.         ------------------------------ ED COURSE/MEDICAL DECISION MAKING----------------------  Medications   sodium chloride flush 0.9 % injection 5-40 mL (5 mLs IntraVENous Not Given 9/13/22 2100)   sodium chloride flush 0.9 % injection 5-40 mL (10 mLs IntraVENous Given 9/13/22 0028)   senna (SENOKOT) tablet 8.6 mg ( Oral Held by provider 9/12/22 1443)   traMADol (ULTRAM) tablet 25 mg ( Oral Held by provider 9/12/22 1443)   LORazepam (ATIVAN) injection 1 mg (1 mg IntraVENous Given 9/13/22 1329)   glycopyrrolate (ROBINUL) injection 0.4 mg (0.4 mg IntraVENous Not Given 9/13/22 2100)   morphine 1 mg/mL in sodium chloride 0.9 % 100 mL infusion (1 mg/hr IntraVENous New Bag 9/13/22 1489) furosemide (LASIX) injection 20 mg (20 mg IntraVENous Given 9/12/22 0408)   midazolam PF (VERSED) injection 2 mg (2 mg IntraVENous Given 9/12/22 0446)   iopamidol (ISOVUE-370) 76 % injection 75 mL (75 mLs IntraVENous Given 9/12/22 0457)   albumin human 25 % IV solution 50 g (0 g IntraVENous Stopped 9/12/22 1700)   bumetanide (BUMEX) injection 1 mg (1 mg IntraVENous Given 9/12/22 1010)   0.9 % sodium chloride bolus (1,000 mLs IntraVENous New Bag 9/13/22 0323)   albumin human 25 % IV solution 50 g (0 g IntraVENous Stopped 9/13/22 0715)         ED COURSE:       Medical Decision Making:    Chf, low o2 at nh, w/u noted, pt stable on o2 nc here. Will admit for further care, family updated      This patient's ED course included: a personal history and physicial examination    This patient has remained hemodynamically stable during their ED course. Re-Evaluations:             Re-evaluation. Patients symptoms are improving    Re-examination  9/12/22   5 AM EDT          Vital Signs:         Consultations:             huseyin    Critical Care:         Counseling: The emergency provider has spoken with the family member son and discussed todays results, in addition to providing specific details for the plan of care and counseling regarding the diagnosis and prognosis. Questions are answered at this time and they are agreeable with the plan.       --------------------------------- IMPRESSION AND DISPOSITION ---------------------------------    IMPRESSION  1. Altered mental status, unspecified altered mental status type    2. Hypoxia    3. Acute congestive heart failure, unspecified heart failure type ([facility] [address]        DISPOSITION  Disposition: Admit to telemetry  Patient condition is stable    NOTE: This report was transcribed using voice recognition software.  Every effort was made to ensure accuracy; however, inadvertent computerized transcription errors may be present        Raman Willams MD  09/14/22 0462

## 2022-09-12 NOTE — ED NOTES
Patient failed swallow screening. Unable to follow commands to swallow.       Melanie Brizuela RN  09/12/22 5704

## 2022-09-12 NOTE — ED NOTES
Patient's son, Susan Laguerre, requesting consult for palliative care. Dr. Alice York called and made aware.       Haily Cueva RN  09/12/22 1200 JAMIE Trivedi RN  09/12/22 4512

## 2022-09-12 NOTE — ED NOTES
Dr. Gretchen Ames made aware of urine color change. No further orders at this time.       Skip Drummond RN  09/12/22 0836

## 2022-09-13 NOTE — PROGRESS NOTES
Occupational Therapy  Date:2022  Patient Name: Miguel Marley  MRN: [de-identified]  : 1935  Room: [address] [place] [address]     OT order received and appreciated. Chart reviewed. OT evaluation held and d/c at this time due to medical status per nursing . Please reconsult if change in status and need arises.     Umang Alvarez OTR/L #[id number]

## 2022-09-13 NOTE — PROGRESS NOTES
Physician Progress Note      PATIENT:               Charlotte Coronado  CSN #:                  [id number]  :                       1935  ADMIT DATE:       2022 1:29 AM  DISCH DATE:  RESPONDING  PROVIDER #:        Elvia Fuchs MD          QUERY TEXT:    Pt admitted with Acute diastolic heart failure, NSTEMI, dehydration, MEI and   UTI. Pt noted to have agitation, lethargy, delayed responses, poor   attention/concentration and GCS scores of 11, 14 & 8. If possible, please   document in the progress notes and discharge summary if you are evaluating and   / or treating any of the following: The medical record reflects the following:  Risk Factors: metastatic lung CA to bone and lymph, MEI, UTI  Clinical Indicators: H&P: agitated, extremely lethargic but arousable; NN:   disoriented x4, delayed responses, poor attention/concentration; GCS 11, 14 &   8; sCr 1.2/57 & 2.1/30; Lactic acid 2.3, Alk Phos 229, AST 53; BNP 8,473,   Troponins 35 & 37; CXR: Pulmonary vascular congestion with atelectasis. Treatment: Cardiology consult, IV albumin, IV Bumex, IV Lasix, Rocephin,   Ativan, Versed, CT head, labs and monitoring  Options provided:  -- Drug-induced encephalopathy due to, Please specify substance. -- Encephalopathy due to, Please specify cause. -- Metabolic encephalopathy  -- Other - I will add my own diagnosis  -- Disagree - Not applicable / Not valid  -- Disagree - Clinically unable to determine / Unknown  -- Refer to Clinical Documentation Reviewer    PROVIDER RESPONSE TEXT:    This patient has metabolic encephalopathy.     Query created by: Patty Cortez on 2022 3:15 PM      Electronically signed by:  Elvia Fuchs MD 2022 3:19 PM

## 2022-09-13 NOTE — PROGRESS NOTES
[facility] Information Visit Note              Patient Name: Michael Ames   Admit date:  9/12/2022   Hospital Admitting Physician:  Ever Morales MD   PCP:  Anel Garvin DO  Primary Insurance: Payor: Davonte Shaw /  /  /       Current Hospital Problem List:   Patient Active Problem List   Diagnosis Code    Essential hypertension I10    Acquired hypothyroidism E03.9    Orthostatic hypotension I95.1    Primary adenocarcinoma of right lung [facility]) C34.91    Acute on chronic respiratory failure with hypoxia ([facility] [address] J96.21    NSTEMI (non-ST elevated myocardial infarction) ([facility] [address] I21.4    Transaminitis R74.01    MEI (acute kidney injury) ([facility] [address] N17.9    Goals of care, counseling/discussion Z71.89    Acute respiratory failure with hypoxia ([facility] [address] J96.01    Hypoxia R09.02    Pulmonary edema J81.1    Hyperkalemia E87.5     Code Status Order: DNR-CC   Allergies:  Patient has no known allergies. Consult received, chart reviewed. Patient resting comfortably in bed, family at bedside. Patient has received one dose of ativan today, 2 doses in the last 24 hours. The hospice benefit and philosophy were explained including that hospice is end of life comfort care in which, per Medicare, a patient has a terminal diagnosis with life expectancy of 6 months or less. Hospice care is a service that is covered by most insurance plans. Explained hospice services at home, at [facility] with room/board private pay unless patient has Medicaid, and the [facility] for short term symptom management and respite. Explained that once in hospice care, all aggressive treatments would be stopped allowing nature to takes its course. Spoke with Fito Siddiqui (son) via phone who expresses his wishes are for patient to transfer to the [facility] or return to [facility] with Raghu Beebe. Explained that the [facility] is for short term management.  Explained that patient does not meet criteria at this time as he is comfortable and has only required 2 doses of ativan in the last 24 hours both which have been effective in controlling agitation. Family would like patient to be reassessed for [facility] in the morning. If patient still does not meet criteria, they are agreeable to patient returning to [facility] with hospice services    Bedside nurse Blank Walker and Case management Keysha both updated on family wishes and plan of care. Bedside nurse to reach out to attending for Robinul order for increased secretions. Will follow up and assess patient tomorrow for [facility]. Please medicate patient as needed for comfort. HOTV plan:  HOTV to follow up tomorrow. Please call Sera Cueva [phone] with any questions. Patient is not currently under the care of hospice.     Electronically signed by Irene Cifuentes LPN on 4/87/1002 at 3:83 PM

## 2022-09-13 NOTE — CARE COORDINATION
Per nursing rounds, patient declined significantly overnight and hospice consult ordered. Met with patient's sons, Judy Beckman and Steven Spear at bedside to choice for hospice agency. They would like a referral made to [facility] and are interested in the hospice house if patient is appropriate. If patient is not appropriate for hospice house, they would like for the patient to return to [facility] with hospice. Referral made to [facility] and someone will be reaching out to family soon; informed them that both of patient's sons are at the bedside. Spoke with nano Phipps at [facility] to determine if patient is able to return with hospice if he is not appropriate for the hospice house; patient is able to return and will be private pay. Patient's sons, Judy Beckman and Steven Spear informed of patient being private pay if he has to return to the facility.      Addie Drew, SONY, LSW [phone]

## 2022-09-13 NOTE — PROGRESS NOTES
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  Apparently received 1 dose of immunotherapy which she did not tolerate well according to the son  He was discharged to a local subacute rehab where he was progressing fairly well up until yesterday when he became agitated and somewhat lethargic  Brought into emergency room  Being admitted for further management  Incidental urinary tract infection and peripheral edema noted  9/13/2022  Patient was seen on the floor earlier this morning  Son was sitting at bedside  Data reviewed in detail  Patient is lethargic does not follow commands    Past Medical History:    Past Medical History:   Diagnosis Date    Cancer ([facility] [address] 2022    lung, lymphoma    Hyperlipidemia     Hypertension     Hypothyroidism    Recently had atrial fibrillation and congestive heart failure    Past Surgical History:    Past Surgical History:   Procedure Laterality Date    CHOLECYSTECTOMY      CORONARY ANGIOPLASTY WITH STENT PLACEMENT  2008    TONSILLECTOMY         Medications Prior to Admission:    Medications Prior to Admission: ascorbic acid (VITAMIN C) 500 MG tablet, Take 500 mg by mouth 2 times daily  bisacodyl (DULCOLAX) 10 MG suppository, Place 10 mg rectally daily as needed for Constipation  tamsulosin (FLOMAX) 0.4 MG capsule, Take 0.4 mg by mouth at bedtime  magnesium hydroxide (MILK OF MAGNESIA) 400 MG/5ML suspension, Take 30 mLs by mouth daily as needed for Constipation  sodium chloride (OCEAN, BABY AYR) 0.65 % nasal spray, 2 sprays by Nasal route every 6 hours as needed for Congestion  traMADol (ULTRAM) 50 MG tablet, Take 25 mg by mouth every 6 hours as needed for Pain. apixaban (ELIQUIS) 5 MG TABS tablet, Take 1 tablet by mouth 2 times daily  metoprolol succinate (TOPROL XL) 25 MG extended release tablet, Take 1 tablet by mouth 2 times daily  furosemide (LASIX) 20 MG tablet, Take 1 tablet by mouth daily  sertraline (ZOLOFT) 50 MG tablet, Take 50 mg by mouth daily  nitroGLYCERIN (NITROSTAT) 0.4 MG SL tablet, Place 0.4 mg under the tongue every 5 minutes as needed for Chest pain  midodrine (PROAMATINE) 2.5 MG tablet, Take 2.5 mg by mouth 3 times daily  Multiple Vitamins-Minerals (THERAPEUTIC MULTIVITAMIN-MINERALS) tablet, Take 1 tablet by mouth daily  levothyroxine (SYNTHROID) 75 MCG tablet, Take 75 mcg by mouth daily  aspirin 81 MG tablet, Take 81 mg by mouth daily  Cholecalciferol (VITAMIN D3) 86558 units CAPS, Take 50,000 Units by mouth every 14 days    Allergies:    Patient has no known allergies. Social History:    reports that he has quit smoking. He has never used smokeless tobacco. He reports that he does not drink alcohol and does not use drugs. Family History:   family history includes Stroke in his father. REVIEW OF SYSTEMS:  As above in the HPI, otherwise negative    PHYSICAL EXAM:    Vitals:  BP (!) 84/50   Pulse 78   Temp 96.8 °F (36 °C) (Temporal)   Resp 18   Ht 5' 9\" (1.753 m)   Wt 206 lb (93.4 kg)   SpO2 100%   BMI 30.42 kg/m²     General: Extremely lethargic not arousable  On supplemental oxygen  HEENT:  Normocephalic, atraumatic. Pupils equal, round, reactive to light. No scleral icterus. No conjunctival injection. Oral mucosa clear no nasal discharge. Neck:  Supple no definite adenopathy  Heart:  RRR, no murmurs, gallops, rubs  Lungs:  CTA bilaterally, bilat symmetrical expansion, no wheeze, rales, or rhonchi  Abdomen:   Bowel sounds present, soft, nontender, no masses, no organomegaly, no peritoneal signs  Extremities: Clubbing noted   2+ edema noted bilateral lower extremities  Skin:  Warm and dry, no open lesions or rash  Neuro:  Cranial nerves 2-12 intact, no focal deficits  Breast: deferred  Rectal: deferred  Genitalia:  deferred    LABS:  Data reviewed      ASSESSMENT:    Peripheral edema multifactorial  Principal Problem:    Acute respiratory failure with hypoxia  Urinary tract infection  Acute kidney injury  Acute diastolic heart failure  Metastatic adenocarcinoma of the lung  Failure to thrive in an adult  History of A.  Fib  Comorbidities present and past as listed below  Patient Active Problem List   Diagnosis    Essential hypertension    Acquired hypothyroidism    Orthostatic hypotension    Primary adenocarcinoma of right lung ([address]    Acute on chronic respiratory failure with hypoxia (HCC)    NSTEMI (non-ST elevated myocardial infarction) ([address]    Transaminitis    MEI (acute kidney injury) ([address]    Goals of care, counseling/discussion    Acute respiratory failure with hypoxia (HCC)    Hypoxia    Pulmonary edema    Hyperkalemia           PLAN:  Overnight patient declined significantly  Reviewed in detail with the son at bedside  Him and his brother both decided to go with a DNR CC status and asked for hospice consult  Questions answered to son's satisfaction  Will Cervantes MD  12:55 PM  9/13/2022

## 2022-09-13 NOTE — PROGRESS NOTES
During the night, this RN spoke to [facility] multiple times re: Agitation, yelling (ativan ordered), Bloody urine (Staley placed, H&H lab ordered), vital signs unstable- mews 4, low BP, low temp SBP 60S & 70s (normal saline bolus, Albumin, bed in trendelenburg), patient's is DNR. Patient's son-Yandel was called to come to hospital to be at Patient's bed side. Currently SBP is in the 80s. Bed is at 35 degrees angle. Patient is sleeping, but hard to arouse, still confuse. Alert to self. Will continue to monitor.

## 2022-09-13 NOTE — PROGRESS NOTES
Physical Therapy    Date: 2022       Patient Name: Latosha Maciel  : 1935      MRN: [id number]    PT order received. Chart has been reviewed. PT evaluation will be on hold per RN. Will continue to follow and complete evaluation at later time when appropriate.  Garo Spence, PT

## 2022-09-13 NOTE — PROGRESS NOTES
Call requested from William Valero (son). Returned call son wishes are for all medications to be discontinued except for comfort meds. Spoke with Nurse Radhika Garza who will reach out to physician  for new orders and to reach out to William Valero. Family and Abigail case management updated of the same.

## 2022-09-13 NOTE — PROGRESS NOTES
Hematology/oncology inpatient follow-up:    Subjective: The patient had declined significantly overnight, he was agitated, and was hypotensive. The patient is lethargic, his son Susan Laguerre is at the bedside. Medications:  Reviewed and reconciled. Physical Exam:  BP (!) 84/50   Pulse 78   Temp 96.8 °F (36 °C) (Temporal)   Resp 18   Ht 5' 9\" (1.753 m)   Wt 206 lb (93.4 kg)   SpO2 100%   BMI 30.42 kg/m²   GENERAL: The patient is lethargic, unresponsive, does not follow commands  HEENT: PERRLA; EOMI. Oropharynx clear. NECK: Supple. Without lymphadenopathy. LUNGS: Decreased air entry bilaterally. CARDIOVASCULAR: Rhythm, normal rate  ABDOMEN: Soft. Non-tender, non-distended. EXTREMITIES: Positive for lower leg edema. NEUROLOGIC: No focal deficits. : Staley cath. + hematuria.   ECOG PS 2    Lab Results   Component Value Date    WBC 10.9 09/13/2022    HGB 8.6 (L) 09/13/2022    HCT 32.0 (L) 09/13/2022    MCV 95.8 09/13/2022     09/13/2022     Lab Results   Component Value Date     09/13/2022    K 6.2 (H) 09/13/2022    CL 98 09/13/2022    CO2 35 (H) 09/13/2022    BUN 57 (H) 09/13/2022    CREATININE 2.1 (H) 09/13/2022    GLUCOSE 113 (H) 09/13/2022    CALCIUM 8.0 (L) 09/13/2022    PROT 6.0 (L) 09/13/2022    LABALBU 3.0 (L) 09/13/2022    BILITOT 1.3 (H) 09/13/2022    ALKPHOS 187 (H) 09/13/2022    AST 52 (H) 09/13/2022    ALT 34 09/13/2022    LABGLOM 30 09/13/2022    GFRAA 36 09/13/2022     Impression/Plan:  Impression/Plan:  72-year-old gentleman, with metastatic non small cell lung cancer, adenocarcinoma with bulky mediastinal and right hilar adenopathy, metastatic disease to the right upper neck, he has subcentimeter nodules in the left lower lobe     PET scan 8/13/2022, revealing FDG avid right cervical and supraclavicular adenopathy, soft tissue mass in the right lung, additional smaller nodules consistent with metastasis, mediastinal and right hilar lymphadenopathy, lymphadenopathy, right Mr. Zhane Avalos.      Kamila Steele MD   HEMATOLOGY/MEDICAL [facility]  [address] [facility] MED ONCOLOGY  [facility] [address] [address]  Dept: [address]: [phone]

## 2022-09-14 NOTE — PROGRESS NOTES
Patient's son- Connie  was called earlier @5875 when HR dropped to 48bpm. DNR-CC. Patient  today(22) @7172 with Connie  at the bedside. @0901, Dr. Sea Weaver was notified (no answer, but left VM). @5070, [facility] was called- Patient is NOT a candidate organ donation. @3127,  home- [facility] was called. Patient accepted and will be picked up around 0800. @1443, [facility] was notified an verbalized understanding. @4781, Dr. Sea Weaver was notified & agreed to sign the Death Certificate. On coming charge RN was giving report of the above & will released the Body to the  home.